# Patient Record
Sex: MALE | Employment: FULL TIME | URBAN - METROPOLITAN AREA
[De-identification: names, ages, dates, MRNs, and addresses within clinical notes are randomized per-mention and may not be internally consistent; named-entity substitution may affect disease eponyms.]

---

## 2022-08-12 ENCOUNTER — TELEPHONE (OUTPATIENT)
Dept: OTHER | Age: 57
End: 2022-08-12

## 2022-08-12 ENCOUNTER — INITIAL CONSULT (OUTPATIENT)
Dept: VASCULAR SURGERY | Age: 57
End: 2022-08-12
Payer: COMMERCIAL

## 2022-08-12 VITALS
RESPIRATION RATE: 20 BRPM | TEMPERATURE: 98.2 F | DIASTOLIC BLOOD PRESSURE: 67 MMHG | HEIGHT: 70 IN | WEIGHT: 143 LBS | SYSTOLIC BLOOD PRESSURE: 117 MMHG | HEART RATE: 62 BPM | OXYGEN SATURATION: 98 % | BODY MASS INDEX: 20.47 KG/M2

## 2022-08-12 DIAGNOSIS — Z01.818 PREOP EXAMINATION: ICD-10-CM

## 2022-08-12 DIAGNOSIS — I70.213 ATHEROSCLER OF NATIVE ARTERY OF BOTH LEGS WITH INTERMIT CLAUDICATION (HCC): Primary | ICD-10-CM

## 2022-08-12 DIAGNOSIS — I70.223 ATHEROSCLEROSIS OF NATIVE ARTERY OF BOTH LOWER EXTREMITIES WITH REST PAIN (HCC): ICD-10-CM

## 2022-08-12 PROCEDURE — 99204 OFFICE O/P NEW MOD 45 MIN: CPT | Performed by: SURGERY

## 2022-08-12 RX ORDER — PREGABALIN 50 MG/1
50 CAPSULE ORAL 3 TIMES DAILY
COMMUNITY

## 2022-08-12 ASSESSMENT — ENCOUNTER SYMPTOMS
COUGH: 0
SHORTNESS OF BREATH: 0
EYE PAIN: 0
COLOR CHANGE: 0
CHEST TIGHTNESS: 0
ABDOMINAL DISTENTION: 0
ABDOMINAL PAIN: 0
VOMITING: 0
VOICE CHANGE: 0
TROUBLE SWALLOWING: 0

## 2022-08-12 NOTE — TELEPHONE ENCOUNTER
Patient calls after hours for appointment issue that is scheduled for today. Spoke with office Wednesday and unsure if can make appt. On time. Patient soft transferred to office staff.   CLAUS.

## 2022-08-12 NOTE — PROGRESS NOTES
[facility]  [address] [facility]  [address] [place] [id number]  Dept: [phone]     Patient: Monica Rebolledo  : 1965  MRN: [id number]  DOS: 2022    Referring provider:  Keke Fernandez MD         HPI:  Monica Rebolledo is a 62 y.o. male who comes to the office for the first time regarding his occluded aorta and iliac arteries. He has short distance claudication at approximately 25 yards. He starts to limp at approximately 50 yards and cannot make a football field. He also has some evidence of rest pain in the lower extremities however I do not think that is severe. He does find himself placing his foot over the side of the bed at night. He has frequent cramps at night which may or may not be related to his arterial disease. He does get some aching in his feet but usually at rest he is without significant pain. He has never had ulcerations in his lower extremities previously. He has not had stroke, TIA or amaurosis fugax. He has never had any heart problems. He continues to work and his claudication is affecting his work. I asked him whether or not he would want an operation to fix his claudication and he was very emphatic that he would want any surgical procedure to improve his walking distance. He is very active and rides a bike and is unable to ride at this point because of his pain in the lower extremities. He plays music at night which is affected by his lower extremity fatigue at that time. In addition to his lower extremity claudication he also has problems with a left renal mass which may be malignant. It certainly looks neoplastic. He has seen urology with Dr. Angeline Brian and they are planning a robotic nephrectomy via transperitoneal approach.   The question became whether or not this could be a combined effort with nephrectomy and placement of the aortobifemoral bypass graft at the same for syncope, speech difficulty, weakness, numbness and headaches. Hematological:  Negative for adenopathy. Psychiatric/Behavioral:  Negative for behavioral problems and suicidal ideas. Vitals:    08/12/22 1312   BP: 117/67   Site: Left Upper Arm   Position: Sitting   Cuff Size: Medium Adult   Pulse: 62   Resp: 20   Temp: 98.2 °F (36.8 °C)   TempSrc: Temporal   SpO2: 98%   Weight: 143 lb (64.9 kg)   Height: 5' 10\" (1.778 m)          Physical Exam  Constitutional:       General: He is not in acute distress. HENT:      Mouth/Throat:      Mouth: Mucous membranes are moist.      Pharynx: Oropharynx is clear. Eyes:      General: No scleral icterus. Extraocular Movements: Extraocular movements intact. Conjunctiva/sclera: Conjunctivae normal.   Neck:      Thyroid: No thyroid mass or thyromegaly. Cardiovascular:      Rate and Rhythm: Normal rate and regular rhythm. Heart sounds: No murmur heard. Comments: He has no carotid bruits. His chest is clear to auscultation bilaterally. His heart has a regular rate and rhythm with no murmurs rubs or gallops. His abdomen is soft nontender nondistended. His lower extremities are warm and adequately perfused. He has good capillary refill. He has no femoral, popliteal, dorsalis pedis or posterior tibial pulses in either lower extremity. He has no ulceration. He has no gangrene. He has no edema. He has no varicosities. Pulmonary:      Effort: No respiratory distress. Breath sounds: No rales. Abdominal:      General: There is no distension. Palpations: There is no mass. Tenderness: There is no abdominal tenderness. There is no guarding. Musculoskeletal:      Cervical back: No rigidity or tenderness. Lymphadenopathy:      Cervical: No cervical adenopathy. Skin:     Coloration: Skin is not jaundiced. Findings: No rash. Neurological:      General: No focal deficit present.       Mental Status: He is alert and oriented to person, place, and time. Cranial Nerves: No cranial nerve deficit. Psychiatric:         Mood and Affect: Mood normal.       Assessment:  1. Atheroscler of native artery of both legs with intermit claudication ([address]    2. 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 They will perform the left nephrectomy robotically transperitoneally at which time I can then take over and perform aortobifemoral bypass. It does not seem that he would need common femoral endarterectomy on either side. He has good vessels in that regard. We will need to evaluate him with a preoperative stress test as well as bilateral lower extremity JULIO PVR and duplex. He understands and agrees with these plans and also understands that these procedures have significant and major risks including but not limited to bleeding, infection, hematoma, nerve damage, permanent neurologic disability, ureteral injury, kidney failure, limb loss, the need for bowel resection, the need for ostomy, stroke, heart attack and death. He agrees to proceed and again is quite emphatic about wanting to do something to improve his walking distance and his ability to work and activity level.     Electronically signed by:  Mary Lou Reyes MD

## 2022-08-24 ENCOUNTER — HOSPITAL ENCOUNTER (OUTPATIENT)
Dept: VASCULAR LAB | Age: 57
Discharge: HOME | End: 2022-08-26
Payer: COMMERCIAL

## 2022-08-24 DIAGNOSIS — I70.213 ATHEROSCLER OF NATIVE ARTERY OF BOTH LEGS WITH INTERMIT CLAUDICATION (HCC): ICD-10-CM

## 2022-08-24 DIAGNOSIS — I70.223 ATHEROSCLEROSIS OF NATIVE ARTERY OF BOTH LOWER EXTREMITIES WITH REST PAIN (HCC): ICD-10-CM

## 2022-08-24 PROCEDURE — 93925 LOWER EXTREMITY STUDY: CPT

## 2022-08-24 PROCEDURE — 93923 UPR/LXTR ART STDY 3+ LVLS: CPT

## 2022-08-29 ENCOUNTER — HOSPITAL ENCOUNTER (OUTPATIENT)
Dept: NON INVASIVE DIAGNOSTICS | Age: 57
Discharge: HOME | End: 2022-08-29
Payer: COMMERCIAL

## 2022-08-29 DIAGNOSIS — Z01.818 PREOP EXAMINATION: ICD-10-CM

## 2022-08-29 PROCEDURE — 6360000002 HC RX W HCPCS: Performed by: FAMILY MEDICINE

## 2022-08-29 PROCEDURE — 3430000000 HC RX DIAGNOSTIC RADIOPHARMACEUTICAL: Performed by: SURGERY

## 2022-08-29 PROCEDURE — A9500 TC99M SESTAMIBI: HCPCS | Performed by: SURGERY

## 2022-08-29 PROCEDURE — 93017 CV STRESS TEST TRACING ONLY: CPT

## 2022-08-29 RX ADMIN — Medication 30 MILLICURIE: at 10:11

## 2022-08-29 RX ADMIN — REGADENOSON 0.4 MG: 0.08 INJECTION, SOLUTION INTRAVENOUS at 10:24

## 2022-08-30 ENCOUNTER — HOSPITAL ENCOUNTER (OUTPATIENT)
Dept: NON INVASIVE DIAGNOSTICS | Age: 57
Discharge: HOME | End: 2022-08-30
Payer: COMMERCIAL

## 2022-08-30 PROCEDURE — 78452 HT MUSCLE IMAGE SPECT MULT: CPT

## 2022-08-30 PROCEDURE — A9500 TC99M SESTAMIBI: HCPCS | Performed by: SURGERY

## 2022-08-30 PROCEDURE — 3430000000 HC RX DIAGNOSTIC RADIOPHARMACEUTICAL: Performed by: SURGERY

## 2022-08-30 RX ADMIN — Medication 30 MILLICURIE: at 15:09

## 2022-08-31 NOTE — PROCEDURES
[address] [place], [place] [address]                              CARDIAC STRESS TEST    PATIENT NAME: Nancy Valdez                  :        1965  MED REC NO:   [id number]                              ROOM:  ACCOUNT NO:   [de-identified]                           ADMIT DATE: 2022  PROVIDER:     Rojelio Herndon MD    CARDIOVASCULAR DIAGNOSTIC DEPARTMENT    DATE OF STUDY:  2022    ORDERING PROVIDER:  Jc Crane MD    PRIMARY CARE PROVIDER:  None    INTERPRETING PHYSICIAN:  Rojelio Herndon MD    PHARMACOLOGIC MYOCARDIAL PERFUSION STRESS TESTING    Stress/rest single isotope SPECT imaging with exercise stress and gated  SPECT imaging. INDICATIONS:  Pre-op. CLINICAL HISTORY:  The patient is a 51-year-old man with no known  coronary artery disease. Previous cardiac history includes stress test.    Other previous history includes chest pain, caffeine, smoker. Symptoms just prior to testing:  None. Relevant medications:  None. PROCEDURE:  The heart rate was 50 at baseline and da to 93 beats per  minute during the regadenoson infusion. The rest blood pressure was  100/60 mm/Hg and increased to 140/70 mm/Hg. The patient did complain of  shortness of breath following infusion. Pharmacologic stress testing was performed with regadenoson at a dose of  0.4 mg. Additionally, low level exercise using slow treadmill walking  was performed along with vasodilator infusion. MYOCARDIAL PERFUSION IMAGING:  Imaging was performed at rest 30-45  minutes following the injection of 30 mCi of sestamibi. Approximately  10 seconds after Lexiscan injection, the patient was injected with 30  mCi of sestamibi. Gating post-stress tomographic imaging was performed  30-45 minutes after stress.     STRESS ECG RESULTS:  The resting electrocardiogram demonstrated normal  sinus rhythm with right bundle branch block without significant  ST-segment abnormalities that may impair accurate ECG detection of  stress induced cardiac ischemia. During vasodilator infusion and during recovery, the patient developed:    No significant ST segment changes suggestive of myocardial ischemia with  no premature atrial contractions (PACs) and no premature ventricular  contractions (PVCs). NUCLEAR IMAGING RESULTS:  The overall quality of the study is fair. Mild attenuation artifact was seen. There is no evidence of abnormal  lung uptake. Additionally, the right ventricle appears normal.  The  left ventricular cavity is noted to be normal in size on the stress  images. There is no evidence of transient ischemic dilatation (TID) of  the left ventricle. Gated SPECT imaging reveals normal myocardial thickening and wall motion  with a calculated left ventricular ejection fraction of 57%. The rest images demonstrated a small/moderate perfusion abnormality of  mild intensity in the inferior, apical and inferoapical region(s), which  is most likely due to artifact. On stress imaging, a small/moderate perfusion abnormality of mild  intensity in the inferior, apical and inferoapical region(s), which is  most likely due to artifact. IMPRESSION:  1. Equivocal myocardial perfusion study. There is a small/moderate  perfusion defect of mild intensity in the inferior, apical and  inferoapical region(s), which is most consistent with artifact, but  cannot rule out a small degree of coronary ischemia. 2.  Global left ventricular systolic function was normal with an EF of  57% without regional wall motion abnormalities. 3.  No significant electrocardiographic evidence of myocardial ischemia  during EKG monitoring without significant associated arrhythmias. Overall, these results are most consistent with a low risk for ischemia.     Depending on the patient's symptoms and level of clinical suspicion,  aggressive medical management versus additional testing by coronary  angiography may be indicated. Manas Godwin MD    D: 08/31/2022 14:07:14       T: 08/31/2022 14:08:17     [email or web address]  Job#: [id number]     Doc#: Unknown    CC:  Kenan Gary.  Gregorio Briggs

## 2022-10-28 ENCOUNTER — OFFICE VISIT (OUTPATIENT)
Dept: VASCULAR SURGERY | Age: 57
End: 2022-10-28
Payer: COMMERCIAL

## 2022-10-28 VITALS
HEIGHT: 70 IN | OXYGEN SATURATION: 100 % | RESPIRATION RATE: 17 BRPM | WEIGHT: 141 LBS | SYSTOLIC BLOOD PRESSURE: 129 MMHG | HEART RATE: 60 BPM | DIASTOLIC BLOOD PRESSURE: 71 MMHG | TEMPERATURE: 97.4 F | BODY MASS INDEX: 20.19 KG/M2

## 2022-10-28 DIAGNOSIS — I70.223 ATHEROSCLEROSIS OF NATIVE ARTERY OF BOTH LOWER EXTREMITIES WITH REST PAIN (HCC): Primary | ICD-10-CM

## 2022-10-28 DIAGNOSIS — I70.213 ATHEROSCLER OF NATIVE ARTERY OF BOTH LEGS WITH INTERMIT CLAUDICATION (HCC): ICD-10-CM

## 2022-10-28 PROCEDURE — 99214 OFFICE O/P EST MOD 30 MIN: CPT | Performed by: SURGERY

## 2022-10-28 RX ORDER — NICOTINE 21 MG/24HR
1 PATCH, TRANSDERMAL 24 HOURS TRANSDERMAL EVERY 24 HOURS
COMMUNITY
Start: 2022-10-03

## 2022-10-28 ASSESSMENT — ENCOUNTER SYMPTOMS
VOICE CHANGE: 0
SHORTNESS OF BREATH: 0
EYE PAIN: 0
ABDOMINAL DISTENTION: 0
COLOR CHANGE: 0
TROUBLE SWALLOWING: 0
CHEST TIGHTNESS: 0
VOMITING: 0
ABDOMINAL PAIN: 0
COUGH: 0

## 2022-10-28 NOTE — PROGRESS NOTES
[facility]  [address] [facility]  [address] [place] [address]  Dept: [phone]     Patient: Boaz Echeverria  : 1965  MRN: [id number]  DOS: 10/28/2022    Referring provider:  No ref. provider found         HPI:  Boaz Echeverria is a 62 y.o. male who returns to the office for his aortic and iliac occlusions. Recall that he has bilateral lower extremity claudication and at times bilateral lower extremity rest pain. He can walk approximately 25 to 50 yards before having significant pain. He is still working for [employer] and still has a requirement of walking quite a bit. Unfortunately he also has a renal tumor on his left and concomitant nephrectomy and aortobifemoral bypass was considered. Urology decided that I should go ahead with the aortobifemoral bypass first and they would then proceed with nephrectomy later. I had a long discussion with the patient regarding aortobifemoral bypass grafting today and the risks benefits and alternatives. He did have his cardiac work-up including a stress test which showed no significant issues and is deemed low risk. History reviewed. No pertinent past medical history. History reviewed. No pertinent family history.    Social History     Socioeconomic History    Marital status:      Spouse name: Not on file    Number of children: Not on file    Years of education: Not on file    Highest education level: Not on file   Occupational History    Not on file   Tobacco Use    Smoking status: Every Day     Types: Cigarettes    Smokeless tobacco: Never   Substance and Sexual Activity    Alcohol use: Never    Drug use: Yes     Types: Marijuana Fab Hilario    Sexual activity: Not on file   Other Topics Concern    Not on file   Social History Narrative    Not on file     Social Determinants of Health     Financial Resource Strain: Not on file   Food Insecurity: Not on file Transportation Needs: Not on file   Physical Activity: Not on file   Stress: Not on file   Social Connections: Not on file   Intimate Partner Violence: Not on file   Housing Stability: Not on file      History reviewed. No pertinent surgical history. Review of Systems   Constitutional:  Negative for activity change, fever and unexpected weight change. HENT:  Negative for trouble swallowing and voice change. Eyes:  Negative for pain and visual disturbance. Respiratory:  Negative for cough, chest tightness and shortness of breath. Cardiovascular:  Negative for chest pain and palpitations. Gastrointestinal:  Negative for abdominal distention, abdominal pain and vomiting. Endocrine: Negative for cold intolerance and heat intolerance. Genitourinary:  Negative for dysuria, flank pain and hematuria. Musculoskeletal:  Negative for joint swelling and neck pain. Skin:  Negative for color change and rash. Allergic/Immunologic: Negative for immunocompromised state. Neurological:  Negative for syncope, speech difficulty, weakness, numbness and headaches. Hematological:  Negative for adenopathy. Psychiatric/Behavioral:  Negative for behavioral problems and suicidal ideas. Vitals:    10/28/22 1421   BP: 129/71   Site: Right Upper Arm   Position: Sitting   Cuff Size: Medium Adult   Pulse: 60   Resp: 17   Temp: 97.4 °F (36.3 °C)   TempSrc: Temporal   SpO2: 100%   Weight: 141 lb (64 kg)   Height: 5' 10\" (1.778 m)          Physical Exam  Constitutional:       General: He is not in acute distress. HENT:      Mouth/Throat:      Mouth: Mucous membranes are moist.      Pharynx: Oropharynx is clear. Eyes:      General: No scleral icterus. Extraocular Movements: Extraocular movements intact. Conjunctiva/sclera: Conjunctivae normal.   Neck:      Thyroid: No thyroid mass or thyromegaly. Cardiovascular:      Rate and Rhythm: Normal rate and regular rhythm. Heart sounds: No murmur heard. Comments: On examination he has no aortic pulse. He has no femoral popliteal dorsalis pedis or posterior tibial pulses in either lower extremity. He has no ulceration or gangrene. He has minimal dependent rubor. He has no edema and no varicosities. Pulmonary:      Effort: No respiratory distress. Breath sounds: No rales. Abdominal:      General: There is no distension. Palpations: There is no mass. Tenderness: There is no abdominal tenderness. There is no guarding. Musculoskeletal:      Cervical back: No rigidity or tenderness. Lymphadenopathy:      Cervical: No cervical adenopathy. Skin:     Coloration: Skin is not jaundiced. Findings: No rash. Neurological:      General: No focal deficit present. Mental Status: He is alert and oriented to person, place, and time. Cranial Nerves: No cranial nerve deficit. Psychiatric:         Mood and Affect: Mood normal.       Assessment:  1. 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 Atheroscler of native artery of both legs with intermit claudication ([address]          Plan: At this time he is still very interested and enthusiastic about going ahead with aortobifemoral bypass so that he can walk. He explains to me that nonsurgical therapy is not an option for him. I explained to him that there still are significant risks to these procedures including but not limited to bleeding, infection, hematoma, nerve damage, permanent neurologic disability, limb loss, bowel ischemia, spinal cord ischemia, myocardial infarction, stroke, heart attack and death as well as injury to any intra-abdominal structures. He understands that there is a risk of graft infection which would mandate its removal and possibly even render him with critical limb ischemia and the need for amputation later on. He understands that there is a risk for limb loss with these procedures as well.   He wants to go ahead to improve his lower extremity symptoms of claudication and rest pain. He has minimal rest pain but significant claudication. We will see him in the operating room for aortobifemoral bypass in the near future.     Electronically signed by:  Earlene Noguera MD

## 2022-11-14 ENCOUNTER — ANESTHESIA EVENT (OUTPATIENT)
Dept: OPERATING ROOM | Age: 57
DRG: 271 | End: 2022-11-14
Payer: COMMERCIAL

## 2022-11-15 ENCOUNTER — ANESTHESIA (OUTPATIENT)
Dept: OPERATING ROOM | Age: 57
DRG: 271 | End: 2022-11-15
Payer: COMMERCIAL

## 2022-11-15 ENCOUNTER — HOSPITAL ENCOUNTER (INPATIENT)
Age: 57
LOS: 4 days | Discharge: HOME | DRG: 271 | End: 2022-11-19
Attending: SURGERY | Admitting: SURGERY
Payer: COMMERCIAL

## 2022-11-15 PROBLEM — I70.0 OCCLUSION OF AORTA (HCC): Status: ACTIVE | Noted: 2022-11-15

## 2022-11-15 LAB
ABSOLUTE EOS #: 0 K/UL (ref 0–0.4)
ABSOLUTE IMMATURE GRANULOCYTE: 0.28 K/UL (ref 0–0.3)
ABSOLUTE LYMPH #: 1.39 K/UL (ref 1–4.8)
ABSOLUTE MONO #: 0.56 K/UL (ref 0.1–0.8)
ALLEN TEST: ABNORMAL
ANION GAP SERPL CALCULATED.3IONS-SCNC: 14 MMOL/L (ref 9–17)
ANION GAP: 13 MMOL/L (ref 7–16)
BASOPHILS # BLD: 1 % (ref 0–2)
BASOPHILS ABSOLUTE: 0.28 K/UL (ref 0–0.2)
BUN BLDV-MCNC: 18 MG/DL (ref 6–20)
CALCIUM SERPL-MCNC: 8.1 MG/DL (ref 8.6–10.4)
CHLORIDE BLD-SCNC: 105 MMOL/L (ref 98–107)
CO2: 20 MMOL/L (ref 20–31)
CREAT SERPL-MCNC: 0.87 MG/DL (ref 0.7–1.2)
EGFR, POC: >60 ML/MIN/1.73M2
EGFR, POC: >60 ML/MIN/1.73M2
EKG ATRIAL RATE: 109 BPM
EKG P AXIS: 70 DEGREES
EKG P-R INTERVAL: 142 MS
EKG Q-T INTERVAL: 390 MS
EKG QRS DURATION: 142 MS
EKG QTC CALCULATION (BAZETT): 525 MS
EKG R AXIS: -83 DEGREES
EKG T AXIS: 56 DEGREES
EKG VENTRICULAR RATE: 109 BPM
EOSINOPHILS RELATIVE PERCENT: 0 % (ref 1–4)
GFR SERPL CREATININE-BSD FRML MDRD: >60 ML/MIN/1.73M2
GLUCOSE BLD-MCNC: 107 MG/DL (ref 74–100)
GLUCOSE BLD-MCNC: 139 MG/DL (ref 75–110)
GLUCOSE BLD-MCNC: 253 MG/DL (ref 74–100)
GLUCOSE BLD-MCNC: 261 MG/DL (ref 70–99)
HCO3 VENOUS: 21.8 MMOL/L (ref 22–29)
HCT VFR BLD CALC: 39.2 % (ref 40.7–50.3)
HCT VFR BLD CALC: 40.1 % (ref 40.7–50.3)
HCT VFR BLD CALC: 41 % (ref 40.7–50.3)
HEMOGLOBIN: 13.1 G/DL (ref 13–17)
HEMOGLOBIN: 13.2 G/DL (ref 13–17)
HEMOGLOBIN: 13.7 G/DL (ref 13–17)
IMMATURE GRANULOCYTES: 1 %
LACTIC ACID, WHOLE BLOOD: 1.2 MMOL/L (ref 0.7–2.1)
LACTIC ACID, WHOLE BLOOD: 3.3 MMOL/L (ref 0.7–2.1)
LYMPHOCYTES # BLD: 5 % (ref 24–44)
MCH RBC QN AUTO: 30.5 PG (ref 25.2–33.5)
MCH RBC QN AUTO: 30.9 PG (ref 25.2–33.5)
MCHC RBC AUTO-ENTMCNC: 32.7 G/DL (ref 28.4–34.8)
MCHC RBC AUTO-ENTMCNC: 33.4 G/DL (ref 28.4–34.8)
MCV RBC AUTO: 92.3 FL (ref 82.6–102.9)
MCV RBC AUTO: 93.5 FL (ref 82.6–102.9)
MONOCYTES # BLD: 2 % (ref 1–7)
MORPHOLOGY: ABNORMAL
NEGATIVE BASE EXCESS, ART: 6 (ref 0–2)
NEGATIVE BASE EXCESS, VEN: 4 (ref 0–2)
NRBC AUTOMATED: 0 PER 100 WBC
NRBC AUTOMATED: 0 PER 100 WBC
O2 DEVICE/FLOW/%: ABNORMAL
O2 SAT, VEN: 98 % (ref 60–85)
PCO2, VEN: 43.5 MM HG (ref 41–51)
PDW BLD-RTO: 15.3 % (ref 11.8–14.4)
PDW BLD-RTO: 15.4 % (ref 11.8–14.4)
PH VENOUS: 7.31 (ref 7.32–7.43)
PLATELET # BLD: 159 K/UL (ref 138–453)
PLATELET # BLD: 203 K/UL (ref 138–453)
PMV BLD AUTO: 10.3 FL (ref 8.1–13.5)
PMV BLD AUTO: 10.6 FL (ref 8.1–13.5)
PO2, VEN: 114.7 MM HG (ref 30–50)
POC BUN: 17 MG/DL (ref 8–26)
POC BUN: 18 MG/DL (ref 8–26)
POC CHLORIDE: 110 MMOL/L (ref 98–107)
POC CHLORIDE: 112 MMOL/L (ref 98–107)
POC CREATININE: 0.69 MG/DL (ref 0.51–1.19)
POC CREATININE: 0.9 MG/DL (ref 0.51–1.19)
POC HCO3: 21.6 MMOL/L (ref 21–28)
POC HEMATOCRIT: 42 % (ref 41–53)
POC HEMATOCRIT: 43 % (ref 41–53)
POC HEMOGLOBIN: 14.1 G/DL (ref 13.5–17.5)
POC HEMOGLOBIN: 14.5 G/DL (ref 13.5–17.5)
POC IONIZED CALCIUM: 1.18 MMOL/L (ref 1.15–1.33)
POC LACTIC ACID: 3.24 MMOL/L (ref 0.56–1.39)
POC LACTIC ACID: 3.84 MMOL/L (ref 0.56–1.39)
POC O2 SATURATION: 99 % (ref 94–98)
POC PCO2: 47.7 MM HG (ref 35–48)
POC PH: 7.26 (ref 7.35–7.45)
POC PO2: 142 MM HG (ref 83–108)
POC POTASSIUM: 3.6 MMOL/L (ref 3.5–4.5)
POC POTASSIUM: 3.9 MMOL/L (ref 3.5–4.5)
POC SODIUM: 143 MMOL/L (ref 138–146)
POC SODIUM: 144 MMOL/L (ref 138–146)
POC TCO2: 22 MMOL/L (ref 22–30)
POTASSIUM SERPL-SCNC: 3.8 MMOL/L (ref 3.7–5.3)
RBC # BLD: 4.29 M/UL (ref 4.21–5.77)
RBC # BLD: 4.44 M/UL (ref 4.21–5.77)
SAMPLE SITE: ABNORMAL
SEG NEUTROPHILS: 91 % (ref 36–66)
SEGMENTED NEUTROPHILS ABSOLUTE COUNT: 25.29 K/UL (ref 1.8–7.7)
SODIUM BLD-SCNC: 139 MMOL/L (ref 135–144)
WBC # BLD: 27.8 K/UL (ref 3.5–11.3)
WBC # BLD: 9.7 K/UL (ref 3.5–11.3)

## 2022-11-15 PROCEDURE — 83605 ASSAY OF LACTIC ACID: CPT

## 2022-11-15 PROCEDURE — 2580000003 HC RX 258: Performed by: STUDENT IN AN ORGANIZED HEALTH CARE EDUCATION/TRAINING PROGRAM

## 2022-11-15 PROCEDURE — C1768 GRAFT, VASCULAR: HCPCS | Performed by: SURGERY

## 2022-11-15 PROCEDURE — 82565 ASSAY OF CREATININE: CPT

## 2022-11-15 PROCEDURE — 86920 COMPATIBILITY TEST SPIN: CPT

## 2022-11-15 PROCEDURE — 86850 RBC ANTIBODY SCREEN: CPT

## 2022-11-15 PROCEDURE — 85014 HEMATOCRIT: CPT

## 2022-11-15 PROCEDURE — 82947 ASSAY GLUCOSE BLOOD QUANT: CPT

## 2022-11-15 PROCEDURE — 93005 ELECTROCARDIOGRAM TRACING: CPT | Performed by: SURGERY

## 2022-11-15 PROCEDURE — 04100JK BYPASS ABDOMINAL AORTA TO BILATERAL FEMORAL ARTERIES WITH SYNTHETIC SUBSTITUTE, OPEN APPROACH: ICD-10-PCS | Performed by: SURGERY

## 2022-11-15 PROCEDURE — 82435 ASSAY OF BLOOD CHLORIDE: CPT

## 2022-11-15 PROCEDURE — 93010 ELECTROCARDIOGRAM REPORT: CPT | Performed by: INTERNAL MEDICINE

## 2022-11-15 PROCEDURE — 3600000005 HC SURGERY LEVEL 5 BASE: Performed by: SURGERY

## 2022-11-15 PROCEDURE — 3600000015 HC SURGERY LEVEL 5 ADDTL 15MIN: Performed by: SURGERY

## 2022-11-15 PROCEDURE — 84132 ASSAY OF SERUM POTASSIUM: CPT

## 2022-11-15 PROCEDURE — 64488 TAP BLOCK BI INJECTION: CPT | Performed by: STUDENT IN AN ORGANIZED HEALTH CARE EDUCATION/TRAINING PROGRAM

## 2022-11-15 PROCEDURE — 84520 ASSAY OF UREA NITROGEN: CPT

## 2022-11-15 PROCEDURE — 36415 COLL VENOUS BLD VENIPUNCTURE: CPT

## 2022-11-15 PROCEDURE — 6360000002 HC RX W HCPCS: Performed by: STUDENT IN AN ORGANIZED HEALTH CARE EDUCATION/TRAINING PROGRAM

## 2022-11-15 PROCEDURE — 6360000002 HC RX W HCPCS: Performed by: SURGERY

## 2022-11-15 PROCEDURE — 84295 ASSAY OF SERUM SODIUM: CPT

## 2022-11-15 PROCEDURE — 2709999900 HC NON-CHARGEABLE SUPPLY: Performed by: SURGERY

## 2022-11-15 PROCEDURE — 82330 ASSAY OF CALCIUM: CPT

## 2022-11-15 PROCEDURE — 2000000000 HC ICU R&B

## 2022-11-15 PROCEDURE — 85025 COMPLETE CBC W/AUTO DIFF WBC: CPT

## 2022-11-15 PROCEDURE — 94761 N-INVAS EAR/PLS OXIMETRY MLT: CPT

## 2022-11-15 PROCEDURE — 80051 ELECTROLYTE PANEL: CPT

## 2022-11-15 PROCEDURE — 6370000000 HC RX 637 (ALT 250 FOR IP): Performed by: SURGERY

## 2022-11-15 PROCEDURE — 86901 BLOOD TYPING SEROLOGIC RH(D): CPT

## 2022-11-15 PROCEDURE — 85027 COMPLETE CBC AUTOMATED: CPT

## 2022-11-15 PROCEDURE — 86900 BLOOD TYPING SEROLOGIC ABO: CPT

## 2022-11-15 PROCEDURE — 3700000001 HC ADD 15 MINUTES (ANESTHESIA): Performed by: SURGERY

## 2022-11-15 PROCEDURE — 3700000000 HC ANESTHESIA ATTENDED CARE: Performed by: SURGERY

## 2022-11-15 PROCEDURE — 2580000003 HC RX 258: Performed by: SURGERY

## 2022-11-15 PROCEDURE — 80048 BASIC METABOLIC PNL TOTAL CA: CPT

## 2022-11-15 PROCEDURE — 35646 BPG AORTOBIFEMORAL: CPT | Performed by: SURGERY

## 2022-11-15 PROCEDURE — 2700000000 HC OXYGEN THERAPY PER DAY

## 2022-11-15 PROCEDURE — 37799 UNLISTED PX VASCULAR SURGERY: CPT

## 2022-11-15 PROCEDURE — 7100000000 HC PACU RECOVERY - FIRST 15 MIN

## 2022-11-15 PROCEDURE — 6370000000 HC RX 637 (ALT 250 FOR IP): Performed by: STUDENT IN AN ORGANIZED HEALTH CARE EDUCATION/TRAINING PROGRAM

## 2022-11-15 PROCEDURE — 82803 BLOOD GASES ANY COMBINATION: CPT

## 2022-11-15 PROCEDURE — 2500000003 HC RX 250 WO HCPCS: Performed by: SURGERY

## 2022-11-15 PROCEDURE — 2500000003 HC RX 250 WO HCPCS: Performed by: STUDENT IN AN ORGANIZED HEALTH CARE EDUCATION/TRAINING PROGRAM

## 2022-11-15 PROCEDURE — A4217 STERILE WATER/SALINE, 500 ML: HCPCS | Performed by: SURGERY

## 2022-11-15 PROCEDURE — 85018 HEMOGLOBIN: CPT

## 2022-11-15 PROCEDURE — 7100000001 HC PACU RECOVERY - ADDTL 15 MIN

## 2022-11-15 DEVICE — ALBOGRAFT POLYESTER VASCULAR GRAFT 16CMX8MM
Type: IMPLANTABLE DEVICE | Status: FUNCTIONAL
Brand: ALBOGRAFT POLYESTER VASCULAR GRAFT

## 2022-11-15 RX ORDER — DEXTROSE MONOHYDRATE 100 MG/ML
INJECTION, SOLUTION INTRAVENOUS CONTINUOUS PRN
Status: DISCONTINUED | OUTPATIENT
Start: 2022-11-15 | End: 2022-11-19 | Stop reason: HOSPADM

## 2022-11-15 RX ORDER — BUPIVACAINE HYDROCHLORIDE 2.5 MG/ML
INJECTION, SOLUTION EPIDURAL; INFILTRATION; INTRACAUDAL
Status: COMPLETED | OUTPATIENT
Start: 2022-11-15 | End: 2022-11-15

## 2022-11-15 RX ORDER — NICARDIPINE HYDROCHLORIDE 0.1 MG/ML
INJECTION INTRAVENOUS CONTINUOUS PRN
Status: DISCONTINUED | OUTPATIENT
Start: 2022-11-15 | End: 2022-11-15 | Stop reason: SDUPTHER

## 2022-11-15 RX ORDER — SODIUM CHLORIDE 9 MG/ML
INJECTION, SOLUTION INTRAVENOUS PRN
Status: DISCONTINUED | OUTPATIENT
Start: 2022-11-15 | End: 2022-11-19 | Stop reason: HOSPADM

## 2022-11-15 RX ORDER — SODIUM CHLORIDE, SODIUM LACTATE, POTASSIUM CHLORIDE, CALCIUM CHLORIDE 600; 310; 30; 20 MG/100ML; MG/100ML; MG/100ML; MG/100ML
INJECTION, SOLUTION INTRAVENOUS CONTINUOUS PRN
Status: DISCONTINUED | OUTPATIENT
Start: 2022-11-15 | End: 2022-11-15 | Stop reason: SDUPTHER

## 2022-11-15 RX ORDER — SODIUM CHLORIDE 0.9 % (FLUSH) 0.9 %
5-40 SYRINGE (ML) INJECTION PRN
Status: DISCONTINUED | OUTPATIENT
Start: 2022-11-15 | End: 2022-11-19 | Stop reason: HOSPADM

## 2022-11-15 RX ORDER — BUPIVACAINE HYDROCHLORIDE 2.5 MG/ML
INJECTION, SOLUTION EPIDURAL; INFILTRATION; INTRACAUDAL
Status: COMPLETED
Start: 2022-11-15 | End: 2022-11-15

## 2022-11-15 RX ORDER — FENTANYL CITRATE 50 UG/ML
25 INJECTION, SOLUTION INTRAMUSCULAR; INTRAVENOUS EVERY 5 MIN PRN
Status: DISCONTINUED | OUTPATIENT
Start: 2022-11-15 | End: 2022-11-15

## 2022-11-15 RX ORDER — SODIUM CHLORIDE 9 MG/ML
INJECTION, SOLUTION INTRAVENOUS CONTINUOUS PRN
Status: DISCONTINUED | OUTPATIENT
Start: 2022-11-15 | End: 2022-11-15 | Stop reason: SDUPTHER

## 2022-11-15 RX ORDER — NICOTINE 21 MG/24HR
1 PATCH, TRANSDERMAL 24 HOURS TRANSDERMAL EVERY 24 HOURS
Status: DISCONTINUED | OUTPATIENT
Start: 2022-11-16 | End: 2022-11-19 | Stop reason: HOSPADM

## 2022-11-15 RX ORDER — DEXAMETHASONE SODIUM PHOSPHATE 10 MG/ML
INJECTION INTRAMUSCULAR; INTRAVENOUS PRN
Status: DISCONTINUED | OUTPATIENT
Start: 2022-11-15 | End: 2022-11-15 | Stop reason: SDUPTHER

## 2022-11-15 RX ORDER — SODIUM CHLORIDE, SODIUM LACTATE, POTASSIUM CHLORIDE, AND CALCIUM CHLORIDE .6; .31; .03; .02 G/100ML; G/100ML; G/100ML; G/100ML
1000 INJECTION, SOLUTION INTRAVENOUS ONCE
Status: DISCONTINUED | OUTPATIENT
Start: 2022-11-15 | End: 2022-11-19 | Stop reason: HOSPADM

## 2022-11-15 RX ORDER — ONDANSETRON 4 MG/1
4 TABLET, ORALLY DISINTEGRATING ORAL EVERY 8 HOURS PRN
Status: DISCONTINUED | OUTPATIENT
Start: 2022-11-15 | End: 2022-11-19 | Stop reason: HOSPADM

## 2022-11-15 RX ORDER — ONDANSETRON 2 MG/ML
INJECTION INTRAMUSCULAR; INTRAVENOUS PRN
Status: DISCONTINUED | OUTPATIENT
Start: 2022-11-15 | End: 2022-11-15 | Stop reason: SDUPTHER

## 2022-11-15 RX ORDER — MORPHINE SULFATE 2 MG/ML
2 INJECTION, SOLUTION INTRAMUSCULAR; INTRAVENOUS EVERY 5 MIN PRN
Status: DISCONTINUED | OUTPATIENT
Start: 2022-11-15 | End: 2022-11-15

## 2022-11-15 RX ORDER — INSULIN LISPRO 100 [IU]/ML
0-16 INJECTION, SOLUTION INTRAVENOUS; SUBCUTANEOUS 3 TIMES DAILY
Status: DISCONTINUED | OUTPATIENT
Start: 2022-11-15 | End: 2022-11-19 | Stop reason: HOSPADM

## 2022-11-15 RX ORDER — SODIUM CHLORIDE 0.9 % (FLUSH) 0.9 %
5-40 SYRINGE (ML) INJECTION EVERY 12 HOURS SCHEDULED
Status: DISCONTINUED | OUTPATIENT
Start: 2022-11-15 | End: 2022-11-19 | Stop reason: HOSPADM

## 2022-11-15 RX ORDER — ONDANSETRON 2 MG/ML
4 INJECTION INTRAMUSCULAR; INTRAVENOUS EVERY 6 HOURS PRN
Status: DISCONTINUED | OUTPATIENT
Start: 2022-11-15 | End: 2022-11-19 | Stop reason: HOSPADM

## 2022-11-15 RX ORDER — OXYCODONE HYDROCHLORIDE 5 MG/1
5 TABLET ORAL EVERY 6 HOURS PRN
Status: DISCONTINUED | OUTPATIENT
Start: 2022-11-15 | End: 2022-11-19 | Stop reason: HOSPADM

## 2022-11-15 RX ORDER — LABETALOL HYDROCHLORIDE 5 MG/ML
10 INJECTION, SOLUTION INTRAVENOUS EVERY 4 HOURS PRN
Status: DISCONTINUED | OUTPATIENT
Start: 2022-11-15 | End: 2022-11-19 | Stop reason: HOSPADM

## 2022-11-15 RX ORDER — PREGABALIN 50 MG/1
50 CAPSULE ORAL 3 TIMES DAILY
Status: DISCONTINUED | OUTPATIENT
Start: 2022-11-15 | End: 2022-11-19 | Stop reason: HOSPADM

## 2022-11-15 RX ORDER — FENTANYL CITRATE 50 UG/ML
INJECTION, SOLUTION INTRAMUSCULAR; INTRAVENOUS PRN
Status: DISCONTINUED | OUTPATIENT
Start: 2022-11-15 | End: 2022-11-15 | Stop reason: SDUPTHER

## 2022-11-15 RX ORDER — HEPARIN SODIUM 1000 [USP'U]/ML
INJECTION, SOLUTION INTRAVENOUS; SUBCUTANEOUS PRN
Status: DISCONTINUED | OUTPATIENT
Start: 2022-11-15 | End: 2022-11-15 | Stop reason: SDUPTHER

## 2022-11-15 RX ORDER — HEPARIN SODIUM 1000 [USP'U]/ML
INJECTION, SOLUTION INTRAVENOUS; SUBCUTANEOUS PRN
Status: DISCONTINUED | OUTPATIENT
Start: 2022-11-15 | End: 2022-11-15 | Stop reason: ALTCHOICE

## 2022-11-15 RX ORDER — ISOFLURANE 1 ML/ML
LIQUID RESPIRATORY (INHALATION)
Status: DISPENSED
Start: 2022-11-15 | End: 2022-11-15

## 2022-11-15 RX ORDER — ASPIRIN 81 MG/1
81 TABLET ORAL DAILY
Status: DISCONTINUED | OUTPATIENT
Start: 2022-11-16 | End: 2022-11-19 | Stop reason: HOSPADM

## 2022-11-15 RX ORDER — LIDOCAINE HYDROCHLORIDE 10 MG/ML
INJECTION, SOLUTION EPIDURAL; INFILTRATION; INTRACAUDAL; PERINEURAL PRN
Status: DISCONTINUED | OUTPATIENT
Start: 2022-11-15 | End: 2022-11-15 | Stop reason: SDUPTHER

## 2022-11-15 RX ORDER — ACETAMINOPHEN 325 MG/1
650 TABLET ORAL
Status: ACTIVE | OUTPATIENT
Start: 2022-11-15 | End: 2022-11-16

## 2022-11-15 RX ORDER — PROPOFOL 10 MG/ML
INJECTION, EMULSION INTRAVENOUS PRN
Status: DISCONTINUED | OUTPATIENT
Start: 2022-11-15 | End: 2022-11-15 | Stop reason: SDUPTHER

## 2022-11-15 RX ORDER — NITROGLYCERIN 20 MG/100ML
INJECTION INTRAVENOUS CONTINUOUS PRN
Status: DISCONTINUED | OUTPATIENT
Start: 2022-11-15 | End: 2022-11-15 | Stop reason: SDUPTHER

## 2022-11-15 RX ORDER — NICARDIPINE HYDROCHLORIDE 0.1 MG/ML
2.5-15 INJECTION INTRAVENOUS CONTINUOUS
Status: DISCONTINUED | OUTPATIENT
Start: 2022-11-15 | End: 2022-11-17

## 2022-11-15 RX ORDER — MORPHINE SULFATE 2 MG/ML
2 INJECTION, SOLUTION INTRAMUSCULAR; INTRAVENOUS
Status: DISCONTINUED | OUTPATIENT
Start: 2022-11-15 | End: 2022-11-18

## 2022-11-15 RX ORDER — BUPIVACAINE HYDROCHLORIDE 2.5 MG/ML
INJECTION, SOLUTION EPIDURAL; INFILTRATION; INTRACAUDAL
Status: DISPENSED
Start: 2022-11-15 | End: 2022-11-15

## 2022-11-15 RX ORDER — SODIUM CHLORIDE, SODIUM LACTATE, POTASSIUM CHLORIDE, CALCIUM CHLORIDE 600; 310; 30; 20 MG/100ML; MG/100ML; MG/100ML; MG/100ML
INJECTION, SOLUTION INTRAVENOUS CONTINUOUS
Status: DISCONTINUED | OUTPATIENT
Start: 2022-11-15 | End: 2022-11-18

## 2022-11-15 RX ORDER — ACETAMINOPHEN 500 MG
1000 TABLET ORAL EVERY 8 HOURS SCHEDULED
Status: DISCONTINUED | OUTPATIENT
Start: 2022-11-15 | End: 2022-11-19 | Stop reason: HOSPADM

## 2022-11-15 RX ORDER — FENTANYL CITRATE 50 UG/ML
50 INJECTION, SOLUTION INTRAMUSCULAR; INTRAVENOUS
Status: DISPENSED | OUTPATIENT
Start: 2022-11-15 | End: 2022-11-16

## 2022-11-15 RX ORDER — KETOROLAC TROMETHAMINE 30 MG/ML
30 INJECTION, SOLUTION INTRAMUSCULAR; INTRAVENOUS ONCE
Status: COMPLETED | OUTPATIENT
Start: 2022-11-15 | End: 2022-11-15

## 2022-11-15 RX ORDER — MIDAZOLAM HYDROCHLORIDE 1 MG/ML
INJECTION INTRAMUSCULAR; INTRAVENOUS PRN
Status: DISCONTINUED | OUTPATIENT
Start: 2022-11-15 | End: 2022-11-15 | Stop reason: SDUPTHER

## 2022-11-15 RX ORDER — LABETALOL HYDROCHLORIDE 5 MG/ML
10 INJECTION, SOLUTION INTRAVENOUS EVERY 4 HOURS
Status: DISCONTINUED | OUTPATIENT
Start: 2022-11-15 | End: 2022-11-15

## 2022-11-15 RX ORDER — PROTAMINE SULFATE 10 MG/ML
INJECTION, SOLUTION INTRAVENOUS PRN
Status: DISCONTINUED | OUTPATIENT
Start: 2022-11-15 | End: 2022-11-15 | Stop reason: SDUPTHER

## 2022-11-15 RX ORDER — KETOROLAC TROMETHAMINE 15 MG/ML
15 INJECTION, SOLUTION INTRAMUSCULAR; INTRAVENOUS EVERY 6 HOURS
Status: COMPLETED | OUTPATIENT
Start: 2022-11-15 | End: 2022-11-17

## 2022-11-15 RX ORDER — MAGNESIUM HYDROXIDE 1200 MG/15ML
LIQUID ORAL CONTINUOUS PRN
Status: COMPLETED | OUTPATIENT
Start: 2022-11-15 | End: 2022-11-15

## 2022-11-15 RX ORDER — HEPARIN SODIUM 1000 [USP'U]/ML
INJECTION, SOLUTION INTRAVENOUS; SUBCUTANEOUS
Status: DISCONTINUED
Start: 2022-11-15 | End: 2022-11-15

## 2022-11-15 RX ORDER — INSULIN LISPRO 100 [IU]/ML
0-4 INJECTION, SOLUTION INTRAVENOUS; SUBCUTANEOUS NIGHTLY
Status: DISCONTINUED | OUTPATIENT
Start: 2022-11-15 | End: 2022-11-19 | Stop reason: HOSPADM

## 2022-11-15 RX ORDER — CEFAZOLIN SODIUM 1 G/3ML
INJECTION, POWDER, FOR SOLUTION INTRAMUSCULAR; INTRAVENOUS PRN
Status: DISCONTINUED | OUTPATIENT
Start: 2022-11-15 | End: 2022-11-15 | Stop reason: SDUPTHER

## 2022-11-15 RX ORDER — ENOXAPARIN SODIUM 100 MG/ML
40 INJECTION SUBCUTANEOUS DAILY
Status: DISCONTINUED | OUTPATIENT
Start: 2022-11-16 | End: 2022-11-19 | Stop reason: HOSPADM

## 2022-11-15 RX ORDER — RIFAMPIN 600 MG/10ML
INJECTION, POWDER, LYOPHILIZED, FOR SOLUTION INTRAVENOUS
Status: DISPENSED
Start: 2022-11-15 | End: 2022-11-15

## 2022-11-15 RX ORDER — PROTAMINE SULFATE 10 MG/ML
INJECTION, SOLUTION INTRAVENOUS
Status: COMPLETED
Start: 2022-11-15 | End: 2022-11-15

## 2022-11-15 RX ORDER — ROCURONIUM BROMIDE 10 MG/ML
INJECTION, SOLUTION INTRAVENOUS PRN
Status: DISCONTINUED | OUTPATIENT
Start: 2022-11-15 | End: 2022-11-15 | Stop reason: SDUPTHER

## 2022-11-15 RX ORDER — NICARDIPINE HYDROCHLORIDE 0.1 MG/ML
INJECTION INTRAVENOUS
Status: COMPLETED
Start: 2022-11-15 | End: 2022-11-15

## 2022-11-15 RX ADMIN — NICARDIPINE HYDROCHLORIDE 12.5 MG/HR: 0.1 INJECTION INTRAVENOUS at 19:13

## 2022-11-15 RX ADMIN — SUGAMMADEX 200 MG: 100 INJECTION, SOLUTION INTRAVENOUS at 12:28

## 2022-11-15 RX ADMIN — FENTANYL CITRATE 100 MCG: 0.05 INJECTION, SOLUTION INTRAMUSCULAR; INTRAVENOUS at 07:59

## 2022-11-15 RX ADMIN — SODIUM CHLORIDE, POTASSIUM CHLORIDE, SODIUM LACTATE AND CALCIUM CHLORIDE: 600; 310; 30; 20 INJECTION, SOLUTION INTRAVENOUS at 07:54

## 2022-11-15 RX ADMIN — ONDANSETRON 4 MG: 2 INJECTION INTRAMUSCULAR; INTRAVENOUS at 12:25

## 2022-11-15 RX ADMIN — FENTANYL CITRATE 50 MCG: 0.05 INJECTION, SOLUTION INTRAMUSCULAR; INTRAVENOUS at 11:31

## 2022-11-15 RX ADMIN — PROTAMINE SULFATE 5 MG: 10 INJECTION, SOLUTION INTRAVENOUS at 11:24

## 2022-11-15 RX ADMIN — CEFAZOLIN 2 G: 1 INJECTION, POWDER, FOR SOLUTION INTRAMUSCULAR; INTRAVENOUS at 11:57

## 2022-11-15 RX ADMIN — MIDAZOLAM 2 MG: 1 INJECTION INTRAMUSCULAR; INTRAVENOUS at 07:55

## 2022-11-15 RX ADMIN — FENTANYL CITRATE 50 MCG: 50 INJECTION INTRAMUSCULAR; INTRAVENOUS at 22:32

## 2022-11-15 RX ADMIN — FENTANYL CITRATE 50 MCG: 0.05 INJECTION, SOLUTION INTRAMUSCULAR; INTRAVENOUS at 08:24

## 2022-11-15 RX ADMIN — SODIUM CHLORIDE: 9 INJECTION, SOLUTION INTRAVENOUS at 09:02

## 2022-11-15 RX ADMIN — NICARDIPINE HYDROCHLORIDE 15 MG/HR: 0.1 INJECTION INTRAVENOUS at 13:37

## 2022-11-15 RX ADMIN — ONDANSETRON 4 MG: 2 INJECTION INTRAMUSCULAR; INTRAVENOUS at 16:00

## 2022-11-15 RX ADMIN — ROCURONIUM BROMIDE 10 MG: 10 INJECTION INTRAVENOUS at 09:09

## 2022-11-15 RX ADMIN — ROCURONIUM BROMIDE 10 MG: 10 INJECTION INTRAVENOUS at 09:16

## 2022-11-15 RX ADMIN — SODIUM CHLORIDE, POTASSIUM CHLORIDE, SODIUM LACTATE AND CALCIUM CHLORIDE: 600; 310; 30; 20 INJECTION, SOLUTION INTRAVENOUS at 11:29

## 2022-11-15 RX ADMIN — SODIUM CHLORIDE: 9 INJECTION, SOLUTION INTRAVENOUS at 07:54

## 2022-11-15 RX ADMIN — FENTANYL CITRATE 50 MCG: 50 INJECTION INTRAMUSCULAR; INTRAVENOUS at 14:45

## 2022-11-15 RX ADMIN — FENTANYL CITRATE 50 MCG: 0.05 INJECTION, SOLUTION INTRAMUSCULAR; INTRAVENOUS at 09:30

## 2022-11-15 RX ADMIN — FENTANYL CITRATE 50 MCG: 0.05 INJECTION, SOLUTION INTRAMUSCULAR; INTRAVENOUS at 12:42

## 2022-11-15 RX ADMIN — PROTAMINE SULFATE 5 MG: 10 INJECTION, SOLUTION INTRAVENOUS at 11:25

## 2022-11-15 RX ADMIN — ROCURONIUM BROMIDE 10 MG: 10 INJECTION INTRAVENOUS at 09:45

## 2022-11-15 RX ADMIN — KETOROLAC TROMETHAMINE 30 MG: 30 INJECTION, SOLUTION INTRAMUSCULAR at 16:32

## 2022-11-15 RX ADMIN — SODIUM CHLORIDE, PRESERVATIVE FREE 10 ML: 5 INJECTION INTRAVENOUS at 21:12

## 2022-11-15 RX ADMIN — FENTANYL CITRATE 50 MCG: 0.05 INJECTION, SOLUTION INTRAMUSCULAR; INTRAVENOUS at 08:33

## 2022-11-15 RX ADMIN — HEPARIN SODIUM 6500 UNITS: 1000 INJECTION, SOLUTION INTRAVENOUS; SUBCUTANEOUS at 09:54

## 2022-11-15 RX ADMIN — FENTANYL CITRATE 50 MCG: 0.05 INJECTION, SOLUTION INTRAMUSCULAR; INTRAVENOUS at 08:37

## 2022-11-15 RX ADMIN — PROTAMINE SULFATE 10 MG: 10 INJECTION, SOLUTION INTRAVENOUS at 11:27

## 2022-11-15 RX ADMIN — LIDOCAINE HYDROCHLORIDE 50 MG: 10 INJECTION, SOLUTION EPIDURAL; INFILTRATION; INTRACAUDAL; PERINEURAL at 07:59

## 2022-11-15 RX ADMIN — CEFAZOLIN 2 G: 1 INJECTION, POWDER, FOR SOLUTION INTRAMUSCULAR; INTRAVENOUS at 08:30

## 2022-11-15 RX ADMIN — SODIUM CHLORIDE, PRESERVATIVE FREE 10 ML: 5 INJECTION INTRAVENOUS at 21:11

## 2022-11-15 RX ADMIN — NITROGLYCERIN 10 MCG/MIN: 20 INJECTION INTRAVENOUS at 09:40

## 2022-11-15 RX ADMIN — ROCURONIUM BROMIDE 10 MG: 10 INJECTION INTRAVENOUS at 10:04

## 2022-11-15 RX ADMIN — NICARDIPINE HYDROCHLORIDE 10 MG/HR: 0.1 INJECTION INTRAVENOUS at 15:06

## 2022-11-15 RX ADMIN — MORPHINE SULFATE 2 MG: 2 INJECTION, SOLUTION INTRAMUSCULAR; INTRAVENOUS at 20:35

## 2022-11-15 RX ADMIN — PROPOFOL 150 MG: 10 INJECTION, EMULSION INTRAVENOUS at 07:59

## 2022-11-15 RX ADMIN — PROTAMINE SULFATE 5 MG: 10 INJECTION, SOLUTION INTRAVENOUS at 11:26

## 2022-11-15 RX ADMIN — KETOROLAC TROMETHAMINE 15 MG: 15 INJECTION, SOLUTION INTRAMUSCULAR; INTRAVENOUS at 23:40

## 2022-11-15 RX ADMIN — NICARDIPINE HYDROCHLORIDE 2.5 MG/HR: 0.1 INJECTION INTRAVENOUS at 12:06

## 2022-11-15 RX ADMIN — FENTANYL CITRATE 50 MCG: 0.05 INJECTION, SOLUTION INTRAMUSCULAR; INTRAVENOUS at 12:12

## 2022-11-15 RX ADMIN — ONDANSETRON 4 MG: 2 INJECTION INTRAMUSCULAR; INTRAVENOUS at 22:40

## 2022-11-15 RX ADMIN — HEPARIN SODIUM 1500 UNITS: 1000 INJECTION, SOLUTION INTRAVENOUS; SUBCUTANEOUS at 10:42

## 2022-11-15 RX ADMIN — ROCURONIUM BROMIDE 50 MG: 10 INJECTION INTRAVENOUS at 08:00

## 2022-11-15 RX ADMIN — PROPOFOL 50 MG: 10 INJECTION, EMULSION INTRAVENOUS at 08:36

## 2022-11-15 RX ADMIN — DEXAMETHASONE SODIUM PHOSPHATE 10 MG: 10 INJECTION INTRAMUSCULAR; INTRAVENOUS at 08:35

## 2022-11-15 RX ADMIN — NICARDIPINE HYDROCHLORIDE 10 MG/HR: 0.1 INJECTION INTRAVENOUS at 17:05

## 2022-11-15 RX ADMIN — FENTANYL CITRATE 50 MCG: 0.05 INJECTION, SOLUTION INTRAMUSCULAR; INTRAVENOUS at 12:33

## 2022-11-15 RX ADMIN — ROCURONIUM BROMIDE 10 MG: 10 INJECTION INTRAVENOUS at 11:20

## 2022-11-15 RX ADMIN — ROCURONIUM BROMIDE 10 MG: 10 INJECTION INTRAVENOUS at 10:31

## 2022-11-15 RX ADMIN — NICARDIPINE HYDROCHLORIDE 12.5 MG/HR: 0.1 INJECTION INTRAVENOUS at 21:13

## 2022-11-15 RX ADMIN — NICARDIPINE HYDROCHLORIDE 12.5 MG/HR: 0.1 INJECTION INTRAVENOUS at 22:39

## 2022-11-15 RX ADMIN — BUPIVACAINE HYDROCHLORIDE 50 ML: 2.5 INJECTION, SOLUTION EPIDURAL; INFILTRATION; INTRACAUDAL; PERINEURAL at 12:20

## 2022-11-15 RX ADMIN — PROTAMINE SULFATE 5 MG: 10 INJECTION, SOLUTION INTRAVENOUS at 11:23

## 2022-11-15 RX ADMIN — MORPHINE SULFATE 2 MG: 2 INJECTION, SOLUTION INTRAMUSCULAR; INTRAVENOUS at 13:28

## 2022-11-15 ASSESSMENT — PAIN SCALES - GENERAL
PAINLEVEL_OUTOF10: 8
PAINLEVEL_OUTOF10: 2
PAINLEVEL_OUTOF10: 8
PAINLEVEL_OUTOF10: 8
PAINLEVEL_OUTOF10: 10
PAINLEVEL_OUTOF10: 6
PAINLEVEL_OUTOF10: 8
PAINLEVEL_OUTOF10: 7

## 2022-11-15 ASSESSMENT — PAIN DESCRIPTION - ORIENTATION: ORIENTATION: RIGHT;LEFT

## 2022-11-15 ASSESSMENT — PAIN DESCRIPTION - LOCATION
LOCATION: ABDOMEN;GROIN
LOCATION: ABDOMEN;GROIN

## 2022-11-15 ASSESSMENT — PAIN SCALES - WONG BAKER: WONGBAKER_NUMERICALRESPONSE: 0

## 2022-11-15 ASSESSMENT — LIFESTYLE VARIABLES: SMOKING_STATUS: 1

## 2022-11-15 NOTE — CONSENT
Informed Consent for Blood Component Transfusion Note    I have discussed with the patient the rationale for blood component transfusion; its benefits in treating or preventing fatigue, organ damage, or death; and its risk which includes mild transfusion reactions, rare risk of blood borne infection, or more serious but rare reactions. I have discussed the alternatives to transfusion, including the risk and consequences of not receiving transfusion. The patient had an opportunity to ask questions and had agreed to proceed with transfusion of blood components.     Electronically signed by Nate Wallis MD on 11/15/22 at 12:17 PM EST

## 2022-11-15 NOTE — OP NOTE
Operative Note      Patient: Petra King  YOB: 1965  MRN: [id number]    Date of Procedure: 11/15/2022    Pre-Op Diagnosis: Aortoiliac occlusive disease with severe claudication and occasional rest pain    Post-Op Diagnosis: Same       Procedure: Aortobifemoral bypass graft using a 16 x 8 bifurcated graft end-to-end proximal anastomosis and end-to-side distal anastomoses    Surgeon(s):  Cristina Mccracken MD    Assistant:   Bisi Frazier    Anesthesia: General    Estimated Blood Loss (mL): 756     Complications: None    Specimens:   * No specimens in log *    Implants:  Implant Name Type Inv. Item Serial No.  Lot No. LRB No. Used Action   GRAFT VASC ALBOGRFT POLY 16 MMX8 MM [id number] CM L KNIT BIFUR - [id number] Vascular grafts GRAFT VASC ALBOGRFT POLY 16 MMX8 MM [id number] CM L KNIT BIFUR [id number] [facility] [employer]-WD [id number] N/A 1 Implanted         Drains:   NG/OG/NJ/NE Tube Nasogastric 18 fr Right nostril (Active)       Urinary Catheter 11/15/22 2 Way (Active)       Findings: The proximal aorta was without significant calcification. There was aortic thrombus below the level of the clamp which was removed. The distal aspect of the transected aorta was oversewn as there were 2 large lumbar arteries still being served by that aorta. The distal anastomoses were to relatively clean common femoral arteries bilaterally. The patient had palpable dorsalis pedis and posterior tibial pulses on the left and a palpable posterior tibial pulse on the right at the conclusion of the case which were strong. He developed a weakly palpable dorsalis pedis pulse on the right. His feet were warm and well-perfused without evidence of embolization. I saw no evidence of malignancy in our dissection planes. The inferior border of the left renal vein was encountered and certainly preserved. The left renal arteries are paired as well as the right.   The inferior border of the inferior most left renal artery was encountered and preserved. Detailed Description of Procedure: The patient was brought to the operating room and placed in a supine position with his arms outstretched at 90 degree angles to his body. Anesthesia performed placement of arterial line as well as IV access and a Field catheter was placed. His arms were tucked at his sides. His lower chest abdomen groins and thighs were prepped and draped in a sterile fashion. He received preoperative antibiotics and timeout was held. A mini laparotomy incision was made first to inspect the abdominal cavity for any peritoneal studding of tumor or liver masses. I did not detect any issues in that regard. This was done with a 15 blade around the umbilicus extending approximately 5 to 7 cm on either side of the umbilicus both inferior and superiorly. Bovie cautery was used to dissect down to and through the fascia and indeed the peritoneum. Once that inspection was performed a wet blue towel was packed on the abdominal contents anteriorly and the groins were opened with 15 blade in a transverse fashion. Bovie cautery was used to dissect down to and through the Radha's fascia and a longitudinal dissection ensued down to and through the femoral sheath. The common femoral artery was identified and exposed circumferentially bilaterally up to and including underneath the inguinal ligament and down to the bifurcation. The profundal was not dissected out on either side. There was adequate length of common femoral artery bilaterally to stay on the common femoral artery with the anastomosis bilaterally. A tunnel was developed underneath the inguinal ligament to stay on top of the external iliac artery for approximately 5 cm in length in both groins. The abdominal incision was extended another 5 cm in either direction and the abdominal contents were swept to his right except for the left colon.   The duodenum was then removed from the aorta by incising the retroperitoneum and reflecting the fourth portion of the duodenum and the proximal jejunum towards the patient's right. The bowels were eventually packed in a blue towel and packed underneath of the abdominal wall. At that point an Omni-Tract retractor was used for retraction to expose the aorta. The aorta was exposed on its anterior and lateral surfaces up to the level of the renal arteries. The renal vein was identified and preserved. The inferior mesenteric vein was identified and preserved. Circumferential exposure was achieved just inferior to the renal arteries to place a transverse clamp. Approximately 2 cm of common iliac artery were exposed bilaterally. A tunnel was developed over the common iliac arteries and over top of the external iliac arteries with finger dissection to stay immediately anterior to the common and external iliac arteries. An aortic clamp was then used to develop a tunnel from the femoral incision over the external iliac artery to meet the finger. An umbilical tape was placed in both of those tunnels on the left and the right to preserve that space. There was no bleeding upon making those tunnels. The patient was given 6500 units of heparin. 3 minutes were allowed to elapse before the aorta was clamped immediately inferior to the renal arteries. The aorta was transected with curved Santiago's as well as Erich's scissors. The underside of the proximal aspect of the aorta below the clamp but above the transection was freed up of the connective tissues to allow better mobilization of the proximal aorta. Thrombus was removed both from the proximal and distal aorta and when it was removed from the distal aorta it began to bleed from the lumbar arteries.   An aortic clamp was placed across the distal aorta and the distal aorta was oversewn with 4-0 Prolene in a running horizontal mattress style along the clamp and then a running whipstitch style back up to the anterior wall of the aorta approximately 2 to 3 mm off of the clamp. The suture was tied to itself as the clamp was removed. There was no bleeding at the conclusion of that maneuver. A 16 x 8 graft was already sized with aortic sizers and placed in rifampin and soaked for approximately half an hour. The graft was cut to length proximally and anastomosed to the aorta using 4-0 Prolene in a running fashion from the 9:00 and 3:00 positions. The 9:00 stitch was placed as well as the 3:00 stitch and the 9:00 stitch was then run along the back wall to make the 3:00 stitch and tied to the 3:00 stitch. The 3:00 stitch was then run along the anterior wall to meet the 9:00 stitch and tied to the 9:00 stitch. The anastomosis was tested and there was no bleeding. The aorta was reclamped immediately below the renal arteries and the remaining graft that was cut away originally was cut to length and placed around the anastomosis and a Cumber bun cuff style. There was again no bleeding at that anastomosis and a Rashard Mabelv[place]  was placed just above the bifurcation of the graft to be below the anastomosis. The graft was then tunneled in the previously made tunnels by placing the aortic clamp through those tunnels using the umbilical tape and the grafts were then tunneled through to the groins on both sides. The right common femoral anastomosis was done first by clamping the proximal and distal common femoral artery with profunda clamps and a longitudinal arteriotomy was made with an 11 blade and Peters scissors measuring 3 cm in length. The graft was spatulated and cut to length and anastomosed in a four-quadrant running fashion using 6-0 Prolene on a C1 needle. Antegrade and retrograde flush was achieved both from the common femoral artery and the graft before the graft was opened to the external iliac artery on the right for 10 heartbeats prior to removing the distal clamp while holding pressure on the superficial femoral artery. 10 heartbeats were allowed to elapse before opening the superficial femoral artery by relieving direct pressure. There was an outstanding pulse in the superficial femoral artery 5 cm below the level of the anastomosis when I was holding pressure. There was no bleeding at the anastomosis. The left anastomosis was done also by clamping the common femoral both proximally and distally and a 3 cm arteriotomy was made with an 11 blade and Peters scissors as well. The graft was cut to length and beveled with a spatulated florence and anastomosed also in a four-quadrant running fashion using 6-0 Prolene on a C1 needle. Antegrade and retrograde flush was achieved with ease prior to closing the anastomosis in its entirety and flow was initiated with the same sequence to the external iliac artery first and then to the profunda and SFA. There was no bleeding at this anastomosis either. At that point I inspected the distal pulses and he had a palpable posterior tibial pulse bilaterally with a palpable dorsalis pedis pulse on the left. I could not feel the dorsalis pedis pulse on the right but later I did feel a faint dorsalis pedis pulse on the right. With this knowledge and the knowledge that there was no ischemic intestines including the small and large intestines the retroperitoneum was closed over the graft using 2-0 Vicryl in a running fashion. The colon and rectum were pink and well-perfused with good peristalsis and none of the small bowel had any injury or ischemia. An NG tube was placed into the stomach and palpated by direct palpation. The anterior abdominal wall was then closed at the level of fascia with a running #1 PDS from either apex and tied in the middle. The skin was closed at the abdominal wall level with skin staples.   The groins were closed in layers using 2-0 Vicryl in an interrupted simple style at the level of the femoral sheath, deep subcutaneous tissue, Radha's fascia and the skin was closed with 4-0 nylon in an interrupted vertical mattress style. There was no bleeding at any of the dissection planes or anastomosis upon closure and the patient was stable throughout the case. He was actually controlled with nitroglycerin for hypertension through most of the case. I do not think he required any pressors. He had approximately 200 cc of blood loss throughout the entire to the case and continued to make urine throughout the entirety of the case. All sponge needle and instrument counts were correct and the abdominal wound and groin wounds were dressed with sterile dressings. He was awakened extubated and returned to the recovery room in stable condition. He tolerated the procedure well. There were no complications. He will stay in the ICU until stable to go to the floor.     Electronically signed by Mati Stahl MD on 11/15/2022 at 12:17 PM

## 2022-11-15 NOTE — PROGRESS NOTES
Division of Vascular Surgery        Postoperative Note    Continues to be tachycardic, excellent urine output last few hours >100 ml, clear urine. Abdomen overall soft, tender appropriately, not peritoneal.  +2 DP/PT pulse on left foot, +1 DP/PT on the right. Will continue to follow labs suspect tachycardia related to pain. Will add Toradol and increase breakthrough pain for tonight. Electronically signed by Gregg Galvan MD on 11/15/22 at 4:16 PM [address] [place] [address] North: [phone]  C: [phone]  Email: [email or web address]

## 2022-11-15 NOTE — PROGRESS NOTES
Patient admitted, consent signed and questions answered. Patient ready for procedure. Call light to reach with side rails up 2 of 2  family at bedside with patient. History and physical updated.    Patient shaved nipples to knees Leah at bedside

## 2022-11-15 NOTE — ANESTHESIA POSTPROCEDURE EVALUATION
Department of Anesthesiology  Postprocedure Note    Patient: Virgilio Chirinos  MRN: [id number]  [place]: 1965  Date of evaluation: 11/15/2022      Procedure Summary     Date: 11/15/22 Room / Location: [address]    Anesthesia Start: 7138 Anesthesia Stop: 1250    Procedure: AORTO BIFEMORAL BYPASS, 16MM X 8MM BIFURCATED ALBOGRAFT GRAFT, BLOCK, CELLSAVER Diagnosis:       Aortic occlusion ([address]      (AORTIC ILIAC OCCLUSION DISEASE)    Surgeons: Aric Yang MD Responsible Provider: Ozzy Beard MD    Anesthesia Type: general ASA Status: 3          Anesthesia Type: No value filed.     Brianne Phase I:      Brianne Phase II:        Anesthesia Post Evaluation    Patient location during evaluation: bedside  Patient participation: complete - patient participated  Level of consciousness: awake  Airway patency: patent  Nausea & Vomiting: no nausea and no vomiting  Complications: no  Cardiovascular status: hemodynamically stable  Respiratory status: acceptable  Hydration status: stable  Comments: /69   Pulse (!) 47   Temp 98.2 °F (36.8 °C) (Oral)   Resp (!) 8   Ht 5' 10\" (1.778 m)   Wt 140 lb (63.5 kg)   SpO2 96%   BMI 20.09 kg/m²

## 2022-11-15 NOTE — H&P
VASCULAR SURGERY H&P  [address] [place] [address]      Patient's Name/ Date of Birth/ Gender: Rozina Borges / 1965 (62 y.o.) / male     Surgeon: Dr. Kitty Tong    History of present Illness: Pt is a 62 y.o. male who presents today for aortobifemoral bypass. He has bilateral lower extremity claudication and at times bilateral lower extremity rest pain. He can walk approximately 25 to 50 yards before having significant pain. He is still working for [employer] and still has a requirement of walking quite a bit. He has never had ulcerations in his lower extremities previously. He has not had stroke, TIA or amaurosis fugax. He has never had any heart problems. He continues to work and his claudication is affecting his work. Unfortunately he also has a renal tumor on his left and concomitant nephrectomy and aortobifemoral bypass was considered. Urology decided that I should go ahead with the aortobifemoral bypass first and they would then proceed with nephrectomy later. The patient denies any history of chest pain and has never had coronary artery problems. He has never had stroke, TIA or amaurosis fugax. He is not diabetic. He does not have hypertension. He smokes cigarettes at approximately 1 pack/day. Past Medical History:  has no past medical history on file. Past Surgical History: No past surgical history on file. Social History:  reports that he has been smoking cigarettes. He has never used smokeless tobacco. He reports current drug use. Drug: Marijuana Brianna Pleas). He reports that he does not drink alcohol. Family History: family history is not on file.     Review of Systems:   General: Denies fever, chills, night sweats, weight loss, malaise, fatigue  HEENT: Denies sore throat, sinus problems, allergic rhinosinusitis  Card: Denies chest pain, palpitations, orthopnea/PND  Pulm: Denies cough, shortness of breath  GI: denies history of constipation, diarrhea, hematochezia or melena  : Denies polyuria, dysuria, hematuria  Endo: Denies diabetes, thyroid problems. Heme: Denies anemia, h/o bleeding or clotting problems. Neuro: Denies h/o CVA, TIA  Skin: Denies rashes, ulcers  Musculoskeletal: Bilateral leg pain with walking. Allergies: Patient has no known allergies. Current Meds:No current facility-administered medications for this encounter. Current Outpatient Medications:     nicotine (NICODERM CQ) 14 MG/24HR, Place 1 patch onto the skin every 24 hours (Patient not taking: Reported on 10/28/2022), Disp: , Rfl:     pregabalin (LYRICA) 50 MG capsule, Take 50 mg by mouth in the morning and 50 mg at noon and 50 mg before bedtime. , Disp: , Rfl:     Vital Signs: There were no vitals filed for this visit. Physical Exam:  Vital signs and Nurse's note reviewed  Gen:  A&Ox3, NAD  HEENT: PERRLA, EOMI, no scleral icterus, oral mucosa moist  Neck: Supple  Chest: Symmetric rise with inhalation, no evidence of trauma  CVS: He has no carotid bruits. His chest is clear to auscultation bilaterally. His heart has a regular rate and rhythm with no murmurs rubs or gallops. His abdomen is soft nontender nondistended. His lower extremities are warm and adequately perfused. He has good capillary refill. He has no femoral, popliteal, dorsalis pedis or posterior tibial pulses in either lower extremity. He has no ulceration. He has no gangrene. He has no edema. He has no varicosities. Resp: Good bilateral air entry, clear to auscultation b/l, no wheeze or rhonchi  Abd: soft, non-tender, non-distended, no guarding or rebound.   CNS: Moves all extremities, no gross focal motor deficits    Labs:   No results found for: WBC, HGB, HCT, MCV, PLT  No results found for: NA, K, CL, CO2, BUN, CREATININE, GLUCOSE, CALCIUM  No results found for: ALKPHOS, ALT, AST, PROT, BILITOT, BILIDIR, LABALBU  No results found for: LACTA  No results found for: AMYLASE  No results found for: LIPASE  No results found for: INR    Imaging:  CTA (uploaded on PACS) demonstrates significant infrarenal aortic occlusion extending to the common femoral arteries. There are significant collateral vessels feeding the external and internal iliac arteries as well as femoral arteries bilaterally. Impression: Atherosclerosis of bilateral lower extremities with rest pain and claudication  Aorto-iliac occlusion    Plan: Will proceed to the OR this morning for aortobifemoral bypass. The risks of the procedure were explained to the patient including but not limited to bleeding, infection, hematoma, nerve damage, permanent neurologic disability, limb loss, bowel ischemia, spinal cord ischemia, myocardial infarction, stroke, heart attack and death as well as injury to any intra-abdominal structures. He understands that there is a risk of graft infection which would mandate its removal and possibly even render him with critical limb ischemia and the need for amputation later on. He understands that there is a risk for limb loss with these procedures as well. He wants to go ahead to improve his lower extremity symptoms of claudication and rest pain. He has minimal rest pain but significant claudication. Written consent obtained.   He will be admitted to the ICU post-operatively    Ana Rosa Dumont DO PGY-2  11/15/22 5:39 AM

## 2022-11-15 NOTE — ANESTHESIA PROCEDURE NOTES
Arterial Line:    An arterial line was placed using ultrasound guidance, in the OR for the following indication(s): continuous blood pressure monitoring and blood sampling needed. A 20 gauge (size), 1 and 1/4 inch (length), Arrow (type) catheter was placed, into the right radial artery, secured by Tegaderm. Anesthesia type: General    Events:  patient tolerated procedure well with no complications. 11/15/2022 8:10 AM  Anesthesiologist: Ozzy Beard MD  Resident/CRNA: JC Barrios - PORFIRIO  Other anesthesia staff: Mel Patton RN  Performed:  Other anesthesia staff   Preanesthetic Checklist  Completed: patient identified, IV checked, site marked, risks and benefits discussed, surgical/procedural consents, equipment checked, pre-op evaluation, timeout performed, anesthesia consent given, oxygen available, monitors applied/VS acknowledged, fire risk safety assessment completed and verbalized and blood product R/B/A discussed and consented

## 2022-11-15 NOTE — ANESTHESIA PROCEDURE NOTES
Peripheral Block    Patient location during procedure: OR  Reason for block: post-op pain management and at surgeon's request  Start time: 11/15/2022 12:20 PM  End time: 11/15/2022 12:25 PM  Staffing  Performed: anesthesiologist   Anesthesiologist: Lola Gay MD  Preanesthetic Checklist  Completed: patient identified, IV checked, site marked, risks and benefits discussed, surgical/procedural consents, equipment checked, pre-op evaluation, timeout performed, anesthesia consent given, oxygen available, monitors applied/VS acknowledged, fire risk safety assessment completed and verbalized and blood product R/B/A discussed and consented  Peripheral Block   Patient position: supine  Prep: ChloraPrep  Provider prep: mask and sterile gloves  Patient monitoring: cardiac monitor, continuous pulse ox, continuous capnometry, frequent blood pressure checks, IV access and oxygen  Block type: TAP  Laterality: bilateral  Injection technique: single-shot  Guidance: ultrasound guided    Needle   Needle type: insulated echogenic nerve stimulator needle   Needle localization: ultrasound guidance  Assessment   Injection assessment: negative aspiration for heme and local visualized surrounding nerve on ultrasound  Slow fractionated injection: yes  Hemodynamics: stable  Outcomes: uncomplicated    Medications Administered  bupivacaine (PF) 0.25 % - Perineural   50 mL - 11/15/2022 12:20:00 PM

## 2022-11-15 NOTE — ANESTHESIA PRE PROCEDURE
Department of Anesthesiology  Preprocedure Note       Name:  Samuel Robledo   Age:  62 y.o.  :  1965                                          MRN:  [id number]         Date:  11/15/2022      Surgeon: Yaima Krishna):  Elena Gallagher MD    Procedure: Procedure(s):  AORTO BIFEMORAL BYPASS   **CELL SAVER**   (EPIDURAL BLOCK PRE-OP)    Medications prior to admission:   Prior to Admission medications    Medication Sig Start Date End Date Taking? Authorizing Provider   nicotine (NICODERM CQ) 14 MG/24HR Place 1 patch onto the skin every 24 hours  Patient not taking: Reported on 10/28/2022 10/3/22   Historical Provider, MD   pregabalin (LYRICA) 50 MG capsule Take 50 mg by mouth in the morning and 50 mg at noon and 50 mg before bedtime. Historical Provider, MD       Current medications:    Current Facility-Administered Medications   Medication Dose Route Frequency Provider Last Rate Last Admin    0.9 % sodium chloride infusion   IntraVENous PRN Elena Gallagher MD           Allergies:  No Known Allergies    Problem List:    Patient Active Problem List   Diagnosis Code    Occlusion of aorta ([facility] [address] I70.0       Past Medical History:  No past medical history on file. Past Surgical History:  No past surgical history on file. Social History:    Social History     Tobacco Use    Smoking status: Every Day     Types: Cigarettes    Smokeless tobacco: Never   Substance Use Topics    Alcohol use: Never                                Ready to quit: Not Answered  Counseling given: Not Answered      Vital Signs (Current): There were no vitals filed for this visit.                                            BP Readings from Last 3 Encounters:   10/28/22 129/71   22 117/67       NPO Status:                                                                                 BMI:   Wt Readings from Last 3 Encounters:   10/28/22 141 lb (64 kg)   22 143 lb (64.9 kg)     There is no height or weight on file to calculate BMI.    CBC: No results found for: WBC, RBC, HGB, HCT, MCV, RDW, PLT    CMP: No results found for: NA, K, CL, CO2, BUN, CREATININE, GFRAA, AGRATIO, LABGLOM, GLUCOSE, GLU, PROT, CALCIUM, BILITOT, ALKPHOS, AST, ALT    POC Tests: No results for input(s): POCGLU, POCNA, POCK, POCCL, POCBUN, POCHEMO, POCHCT in the last 72 hours. Coags: No results found for: PROTIME, INR, APTT    HCG (If Applicable): No results found for: PREGTESTUR, PREGSERUM, HCG, HCGQUANT     ABGs: No results found for: PHART, PO2ART, LRW3QXK, VEE0RTL, BEART, A3VFZNDD     Type & Screen (If Applicable):  No results found for: LABABO, LABRH    Drug/Infectious Status (If Applicable):  No results found for: HIV, HEPCAB    COVID-19 Screening (If Applicable): No results found for: COVID19        Anesthesia Evaluation  Patient summary reviewed no history of anesthetic complications:   Airway: Mallampati: II  TM distance: >3 FB   Neck ROM: full  Mouth opening: > = 3 FB   Dental:    (+) poor dentition  Comment: Significantly poor dentition - rotten and decaying teeth. Pulmonary:normal exam    (+) current smoker                           Cardiovascular:  Exercise tolerance: good (>4 METS),                 Stress test reviewed                Neuro/Psych:   Negative Neuro/Psych ROS              GI/Hepatic/Renal:            ROS comment: Renal tumor. .   Endo/Other:    (+) malignancy/cancer. Abdominal:             Vascular:   + PVD, aortic or cerebral, . Other Findings:           Anesthesia Plan      general     ASA 3         arterial line  MIPS: Postoperative opioids intended, Prophylactic antiemetics administered and Postoperative trial extubation. Anesthetic plan and risks discussed with patient. Use of blood products discussed with patient whom consented to blood products. Plan discussed with CRNA.             ABDIAZIZ Ku MD   11/15/2022

## 2022-11-15 NOTE — PLAN OF CARE
Problem: Discharge Planning  Goal: Discharge to home or other facility with appropriate resources  11/15/2022 1855 by Katharina Leiva RN  Outcome: Progressing  11/15/2022 1853 by Katharina Leiva RN  Outcome: Progressing     Problem: Pain  Goal: Verbalizes/displays adequate comfort level or baseline comfort level  11/15/2022 1855 by Katharina Leiva RN  Outcome: Progressing  11/15/2022 1853 by Katharina Leiva RN  Outcome: Progressing

## 2022-11-16 LAB
ABO/RH: NORMAL
ABSOLUTE EOS #: <0.03 K/UL (ref 0–0.44)
ABSOLUTE IMMATURE GRANULOCYTE: 0.18 K/UL (ref 0–0.3)
ABSOLUTE LYMPH #: 1.22 K/UL (ref 1.1–3.7)
ABSOLUTE MONO #: 1.18 K/UL (ref 0.1–1.2)
ANION GAP SERPL CALCULATED.3IONS-SCNC: 8 MMOL/L (ref 9–17)
ANTIBODY SCREEN: NEGATIVE
ARM BAND NUMBER: NORMAL
BASOPHILS # BLD: 0 % (ref 0–2)
BASOPHILS ABSOLUTE: 0.03 K/UL (ref 0–0.2)
BLD PROD TYP BPU: NORMAL
BLD PROD TYP BPU: NORMAL
BPU ID: NORMAL
BPU ID: NORMAL
BUN BLDV-MCNC: 16 MG/DL (ref 6–20)
CALCIUM SERPL-MCNC: 8.1 MG/DL (ref 8.6–10.4)
CHLORIDE BLD-SCNC: 106 MMOL/L (ref 98–107)
CO2: 23 MMOL/L (ref 20–31)
CREAT SERPL-MCNC: 0.7 MG/DL (ref 0.7–1.2)
CROSSMATCH RESULT: NORMAL
CROSSMATCH RESULT: NORMAL
DISPENSE STATUS BLOOD BANK: NORMAL
DISPENSE STATUS BLOOD BANK: NORMAL
EOSINOPHILS RELATIVE PERCENT: 0 % (ref 1–4)
EXPIRATION DATE: NORMAL
GFR SERPL CREATININE-BSD FRML MDRD: >60 ML/MIN/1.73M2
GLUCOSE BLD-MCNC: 125 MG/DL (ref 75–110)
GLUCOSE BLD-MCNC: 126 MG/DL (ref 75–110)
GLUCOSE BLD-MCNC: 133 MG/DL (ref 75–110)
GLUCOSE BLD-MCNC: 149 MG/DL (ref 70–99)
HCT VFR BLD CALC: 34.8 % (ref 40.7–50.3)
HCT VFR BLD CALC: 35.2 % (ref 40.7–50.3)
HCT VFR BLD CALC: 36.8 % (ref 40.7–50.3)
HCT VFR BLD CALC: 36.9 % (ref 40.7–50.3)
HEMOGLOBIN: 11.7 G/DL (ref 13–17)
HEMOGLOBIN: 11.7 G/DL (ref 13–17)
HEMOGLOBIN: 12.1 G/DL (ref 13–17)
HEMOGLOBIN: 12.3 G/DL (ref 13–17)
IMMATURE GRANULOCYTES: 1 %
LYMPHOCYTES # BLD: 7 % (ref 24–43)
MCH RBC QN AUTO: 30.3 PG (ref 25.2–33.5)
MCHC RBC AUTO-ENTMCNC: 32.9 G/DL (ref 28.4–34.8)
MCV RBC AUTO: 92 FL (ref 82.6–102.9)
MONOCYTES # BLD: 7 % (ref 3–12)
NRBC AUTOMATED: 0 PER 100 WBC
PDW BLD-RTO: 14.9 % (ref 11.8–14.4)
PLATELET # BLD: ABNORMAL K/UL (ref 138–453)
PLATELET, FLUORESCENCE: 148 K/UL (ref 138–453)
PLATELET, IMMATURE FRACTION: 5.7 % (ref 1.1–10.3)
POTASSIUM SERPL-SCNC: 4.1 MMOL/L (ref 3.7–5.3)
RBC # BLD: 4 M/UL (ref 4.21–5.77)
RBC # BLD: ABNORMAL 10*6/UL
SEG NEUTROPHILS: 85 % (ref 36–65)
SEGMENTED NEUTROPHILS ABSOLUTE COUNT: 15.4 K/UL (ref 1.5–8.1)
SODIUM BLD-SCNC: 137 MMOL/L (ref 135–144)
TRANSFUSION STATUS: NORMAL
TRANSFUSION STATUS: NORMAL
UNIT DIVISION: 0
UNIT DIVISION: 0
WBC # BLD: 18 K/UL (ref 3.5–11.3)

## 2022-11-16 PROCEDURE — 6360000002 HC RX W HCPCS: Performed by: STUDENT IN AN ORGANIZED HEALTH CARE EDUCATION/TRAINING PROGRAM

## 2022-11-16 PROCEDURE — 2500000003 HC RX 250 WO HCPCS: Performed by: STUDENT IN AN ORGANIZED HEALTH CARE EDUCATION/TRAINING PROGRAM

## 2022-11-16 PROCEDURE — 85025 COMPLETE CBC W/AUTO DIFF WBC: CPT

## 2022-11-16 PROCEDURE — 2700000000 HC OXYGEN THERAPY PER DAY

## 2022-11-16 PROCEDURE — 85014 HEMATOCRIT: CPT

## 2022-11-16 PROCEDURE — 6370000000 HC RX 637 (ALT 250 FOR IP): Performed by: STUDENT IN AN ORGANIZED HEALTH CARE EDUCATION/TRAINING PROGRAM

## 2022-11-16 PROCEDURE — 6360000002 HC RX W HCPCS: Performed by: SURGERY

## 2022-11-16 PROCEDURE — 2580000003 HC RX 258: Performed by: STUDENT IN AN ORGANIZED HEALTH CARE EDUCATION/TRAINING PROGRAM

## 2022-11-16 PROCEDURE — 85018 HEMOGLOBIN: CPT

## 2022-11-16 PROCEDURE — 80048 BASIC METABOLIC PNL TOTAL CA: CPT

## 2022-11-16 PROCEDURE — 85055 RETICULATED PLATELET ASSAY: CPT

## 2022-11-16 PROCEDURE — 94761 N-INVAS EAR/PLS OXIMETRY MLT: CPT

## 2022-11-16 PROCEDURE — 2000000000 HC ICU R&B

## 2022-11-16 RX ORDER — FENTANYL CITRATE 50 UG/ML
50 INJECTION, SOLUTION INTRAMUSCULAR; INTRAVENOUS
Status: DISPENSED | OUTPATIENT
Start: 2022-11-16 | End: 2022-11-16

## 2022-11-16 RX ADMIN — NICARDIPINE HYDROCHLORIDE 10 MG/HR: 0.1 INJECTION INTRAVENOUS at 13:03

## 2022-11-16 RX ADMIN — MORPHINE SULFATE 2 MG: 2 INJECTION, SOLUTION INTRAMUSCULAR; INTRAVENOUS at 11:07

## 2022-11-16 RX ADMIN — SODIUM CHLORIDE, PRESERVATIVE FREE 10 ML: 5 INJECTION INTRAVENOUS at 11:07

## 2022-11-16 RX ADMIN — FENTANYL CITRATE 50 MCG: 50 INJECTION INTRAMUSCULAR; INTRAVENOUS at 11:28

## 2022-11-16 RX ADMIN — MORPHINE SULFATE 2 MG: 2 INJECTION, SOLUTION INTRAMUSCULAR; INTRAVENOUS at 05:16

## 2022-11-16 RX ADMIN — NICARDIPINE HYDROCHLORIDE 10 MG/HR: 0.1 INJECTION INTRAVENOUS at 00:27

## 2022-11-16 RX ADMIN — FENTANYL CITRATE 50 MCG: 50 INJECTION INTRAMUSCULAR; INTRAVENOUS at 14:13

## 2022-11-16 RX ADMIN — KETOROLAC TROMETHAMINE 15 MG: 15 INJECTION, SOLUTION INTRAMUSCULAR; INTRAVENOUS at 17:16

## 2022-11-16 RX ADMIN — SODIUM CHLORIDE, POTASSIUM CHLORIDE, SODIUM LACTATE AND CALCIUM CHLORIDE: 600; 310; 30; 20 INJECTION, SOLUTION INTRAVENOUS at 13:15

## 2022-11-16 RX ADMIN — ENOXAPARIN SODIUM 40 MG: 100 INJECTION SUBCUTANEOUS at 11:02

## 2022-11-16 RX ADMIN — SODIUM CHLORIDE, POTASSIUM CHLORIDE, SODIUM LACTATE AND CALCIUM CHLORIDE: 600; 310; 30; 20 INJECTION, SOLUTION INTRAVENOUS at 22:00

## 2022-11-16 RX ADMIN — NICARDIPINE HYDROCHLORIDE 10 MG/HR: 0.1 INJECTION INTRAVENOUS at 06:44

## 2022-11-16 RX ADMIN — NICARDIPINE HYDROCHLORIDE 10 MG/HR: 0.1 INJECTION INTRAVENOUS at 04:32

## 2022-11-16 RX ADMIN — NICARDIPINE HYDROCHLORIDE 10 MG/HR: 0.1 INJECTION INTRAVENOUS at 15:06

## 2022-11-16 RX ADMIN — SODIUM CHLORIDE, PRESERVATIVE FREE 10 ML: 5 INJECTION INTRAVENOUS at 10:57

## 2022-11-16 RX ADMIN — KETOROLAC TROMETHAMINE 15 MG: 15 INJECTION, SOLUTION INTRAMUSCULAR; INTRAVENOUS at 22:06

## 2022-11-16 RX ADMIN — NICARDIPINE HYDROCHLORIDE 10 MG/HR: 0.1 INJECTION INTRAVENOUS at 10:59

## 2022-11-16 RX ADMIN — KETOROLAC TROMETHAMINE 15 MG: 15 INJECTION, SOLUTION INTRAMUSCULAR; INTRAVENOUS at 05:40

## 2022-11-16 RX ADMIN — KETOROLAC TROMETHAMINE 15 MG: 15 INJECTION, SOLUTION INTRAMUSCULAR; INTRAVENOUS at 10:57

## 2022-11-16 RX ADMIN — MORPHINE SULFATE 2 MG: 2 INJECTION, SOLUTION INTRAMUSCULAR; INTRAVENOUS at 13:03

## 2022-11-16 RX ADMIN — NICARDIPINE HYDROCHLORIDE 10 MG/HR: 0.1 INJECTION INTRAVENOUS at 20:00

## 2022-11-16 RX ADMIN — NICARDIPINE HYDROCHLORIDE 10 MG/HR: 0.1 INJECTION INTRAVENOUS at 02:22

## 2022-11-16 RX ADMIN — NICARDIPINE HYDROCHLORIDE 10 MG/HR: 0.1 INJECTION INTRAVENOUS at 17:16

## 2022-11-16 RX ADMIN — FENTANYL CITRATE 50 MCG: 50 INJECTION INTRAMUSCULAR; INTRAVENOUS at 21:45

## 2022-11-16 RX ADMIN — NICARDIPINE HYDROCHLORIDE 5 MG/HR: 0.1 INJECTION INTRAVENOUS at 22:00

## 2022-11-16 RX ADMIN — NICARDIPINE HYDROCHLORIDE 10 MG/HR: 0.1 INJECTION INTRAVENOUS at 09:03

## 2022-11-16 ASSESSMENT — PAIN DESCRIPTION - LOCATION: LOCATION: GROIN;ABDOMEN

## 2022-11-16 ASSESSMENT — PAIN SCALES - GENERAL
PAINLEVEL_OUTOF10: 8
PAINLEVEL_OUTOF10: 10
PAINLEVEL_OUTOF10: 3
PAINLEVEL_OUTOF10: 10
PAINLEVEL_OUTOF10: 10
PAINLEVEL_OUTOF10: 7
PAINLEVEL_OUTOF10: 7
PAINLEVEL_OUTOF10: 3
PAINLEVEL_OUTOF10: 2
PAINLEVEL_OUTOF10: 0
PAINLEVEL_OUTOF10: 10

## 2022-11-16 NOTE — PROGRESS NOTES
Interval Progress Note:    Discussed hematuria with pt's urologist, Dr. Bran Elmore. He recommends we continue to monitor and RN can irrigate catheter as needed. Pt remains hemodynamically stable today, on 10 of cardene. Pain mostly controlled with IV medications. Continue strict NPO, NG tube to suction, swain catheter, neurovascular checks. Will make changes tomorrow based on overnight events.     Jewell Gaitan, DO PGY-2  11/16/22 5:01 PM

## 2022-11-16 NOTE — PROGRESS NOTES
Vascular Surgery   Progress Note    PATIENT NAME: Harinder Jacob     TODAY'S DATE: 11/16/2022, 11:05 AM    SUBJECTIVE:     Pt seen and examined at bedside. Hematuria overnight without clots. Tachycardia resolved with fluids and pain control. Pt states overall pain is 6/10 when not moving. C/O sore throat from NG tube. Denies fever, chills, nausea, vomiting, chest pain, and SOB. No flatus or bowel movements yet. Reports both legs are warmer and easier to move and stretch. OBJECTIVE:   VITALS:  /70   Pulse 89   Temp 96.8 °F (36 °C) (Axillary)   Resp 17   Ht 5' 10\" (1.778 m)   Wt 140 lb (63.5 kg)   SpO2 97%   BMI 20.09 kg/m²      INTAKE/OUTPUT:      Intake/Output Summary (Last 24 hours) at 11/16/2022 1105  Last data filed at 11/16/2022 0818  Gross per 24 hour   Intake 6423.49 ml   Output 1540 ml   Net 4883.49 ml       PHYSICAL EXAM:  General Appearance: awake, alert, oriented, in no acute distress  HEENT:  Normocephalic, atraumatic, mucus membranes moist   Heart: Regular rate and rhythm  Lungs: normal effort with symmetric rise and fall of chest wall  Abdomen: soft, nondistended, appropriately tender, midline incision with minimal strikethrough bleeding, bilateral groins soft without hematoma  Extremities: RLE - palpable DP and PT pulse (DP faint); LLE - palpable DP, PT pulse  Skin: Skin color, texture, turgor normal. No rashes or lesions. Data:  CBC:   Recent Labs     11/15/22  0734 11/15/22  1319 11/15/22  1704 11/16/22  0059 11/16/22  0558 11/16/22  1042   WBC 9.7 27.8*  --   --  18.0*  --    HGB 13.1 13.7   < > 12.3* 12.1* 11.7*    203  --   --  See Reflexed IPF Result  --     < > = values in this interval not displayed.      Chemistry:   Recent Labs     11/15/22  1317 11/15/22  1319 11/15/22  1704 11/15/22  2344 11/16/22  0558   NA  --  139  --   --  137   K  --  3.8  --   --  4.1   CL  --  105  --   --  106   CO2  --  20  --   --  23   GLUCOSE  --  261*  --   --  149*   BUN  -- 18  --   --  16   CREATININE 0.90 0.87  --   --  0.70   ANIONGAP  --  14  --   --  8*   LABGLOM  --  >60  --   --  >60   CALCIUM  --  8.1*  --   --  8.1*   LACTACIDWB  --   --  3.3* 1.2  --      Hepatic: No results for input(s): AST, ALT, ALB, ALKPHOS, BILITOT, BILIDIR in the last 72 hours. Coagulation: No results for input(s): APTT, PROT, INR in the last 72 hours. Radiology Review:    No results found. ASSESSMENT:  Active Hospital Problems    Diagnosis Date Noted    Occlusion of aorta ([facility] [address] [I70.0] 11/15/2022     Priority: Medium       62 y.o. male with Aorto-iliac occlusive disease POD# 1 s/p Aorto-bifemoral bypass. Plan:  CV/Heme:   Continue cardene for blood pressure control  Tachycardia resolved with fluids and pain control  Hemoglobin stable  Pulm:   encourage incentive spirometer q1 hour  GI:   Continue strict NPO  NG tube to suction. Will consider oral medications tomorrow  Renal:   strict Is/Os  continue swain  hematuria- will discuss with pt's urologist Dr. Chelsea Campos  Neuro:   Pain control: Fentanyl, Toradol  Endo  HDSS, glucose within normal range overnight  ID  White count increased most likely from inflammatory response. Will continue to monitor  Vasc:   Continue neurovascular checks.    DP and PT pulses palpable bilaterally  Prophylaxis: Lovenox to start this morning  Incisions: dressings will be changed POD 2    Electronically signed by Erick Dailey DO  on 11/16/2022 at 11:05 AM

## 2022-11-16 NOTE — CARE COORDINATION
11/16/22 0852   Service Assessment   Patient Orientation Alert and Oriented   Cognition Alert   History Provided By Patient   Primary Caregiver Self   Support Systems Spouse/Significant Other   Patient's Healthcare Decision Maker is: Legal Next of Kin   PCP Verified by CM Yes   Last Visit to PCP Within last 3 months   Prior Functional Level Independent in ADLs/IADLs   Current Functional Level Independent in ADLs/IADLs   Can patient return to prior living arrangement Yes   Ability to make needs known: Good   Family able to assist with home care needs: Yes   Would you like for me to discuss the discharge plan with any other family members/significant others, and if so, who? Yes  (Spouse, if needed.)   Discharge Planning   Type of Residence House   Living Arrangements Spouse/Significant Other   Current Services Prior To Admission None   Potential Assistance Needed N/A  (Needs denied.)   DME Ordered? No   Potential Assistance Purchasing Medications Yes  (If new medication co-pays are expensive. Discussed drug  assistance programs.)   Patient expects to be discharged to: House   One/Two Story Residence Two story   History of falls? 0   Services At/After Discharge   Transition of Care Consult (CM Consult) Discharge Planning   Services At/After Discharge None   Confirm Follow Up Transport Other (see comment)  (Sister to provide transport, spouse does not drive on highways.)   Condition of Participation: Discharge Planning   The Plan for Transition of Care is related to the following treatment goals: to go home. Pt's plan is to home, will have transportation, need PT/OT evals.

## 2022-11-17 LAB
ABSOLUTE EOS #: <0.03 K/UL (ref 0–0.44)
ABSOLUTE IMMATURE GRANULOCYTE: 0.15 K/UL (ref 0–0.3)
ABSOLUTE LYMPH #: 1.06 K/UL (ref 1.1–3.7)
ABSOLUTE MONO #: 1.28 K/UL (ref 0.1–1.2)
ANION GAP SERPL CALCULATED.3IONS-SCNC: 9 MMOL/L (ref 9–17)
BASOPHILS # BLD: 0 % (ref 0–2)
BASOPHILS ABSOLUTE: <0.03 K/UL (ref 0–0.2)
BUN BLDV-MCNC: 17 MG/DL (ref 6–20)
CALCIUM SERPL-MCNC: 8.1 MG/DL (ref 8.6–10.4)
CHLORIDE BLD-SCNC: 106 MMOL/L (ref 98–107)
CO2: 24 MMOL/L (ref 20–31)
CREAT SERPL-MCNC: 0.57 MG/DL (ref 0.7–1.2)
EOSINOPHILS RELATIVE PERCENT: 0 % (ref 1–4)
GFR SERPL CREATININE-BSD FRML MDRD: >60 ML/MIN/1.73M2
GLUCOSE BLD-MCNC: 101 MG/DL (ref 75–110)
GLUCOSE BLD-MCNC: 111 MG/DL (ref 75–110)
GLUCOSE BLD-MCNC: 118 MG/DL (ref 75–110)
GLUCOSE BLD-MCNC: 126 MG/DL (ref 70–99)
GLUCOSE BLD-MCNC: 128 MG/DL (ref 75–110)
HCT VFR BLD CALC: 34.3 % (ref 40.7–50.3)
HCT VFR BLD CALC: 34.5 % (ref 40.7–50.3)
HEMOGLOBIN: 11.5 G/DL (ref 13–17)
HEMOGLOBIN: 11.6 G/DL (ref 13–17)
IMMATURE GRANULOCYTES: 1 %
LYMPHOCYTES # BLD: 6 % (ref 24–43)
MCH RBC QN AUTO: 30.6 PG (ref 25.2–33.5)
MCHC RBC AUTO-ENTMCNC: 33.3 G/DL (ref 28.4–34.8)
MCV RBC AUTO: 91.8 FL (ref 82.6–102.9)
MONOCYTES # BLD: 7 % (ref 3–12)
NRBC AUTOMATED: 0 PER 100 WBC
PDW BLD-RTO: 15 % (ref 11.8–14.4)
PLATELET # BLD: ABNORMAL K/UL (ref 138–453)
PLATELET, FLUORESCENCE: 139 K/UL (ref 138–453)
PLATELET, IMMATURE FRACTION: 6 % (ref 1.1–10.3)
POTASSIUM SERPL-SCNC: 4 MMOL/L (ref 3.7–5.3)
RBC # BLD: 3.76 M/UL (ref 4.21–5.77)
RBC # BLD: ABNORMAL 10*6/UL
SEG NEUTROPHILS: 86 % (ref 36–65)
SEGMENTED NEUTROPHILS ABSOLUTE COUNT: 15.41 K/UL (ref 1.5–8.1)
SODIUM BLD-SCNC: 139 MMOL/L (ref 135–144)
WBC # BLD: 17.9 K/UL (ref 3.5–11.3)

## 2022-11-17 PROCEDURE — 6360000002 HC RX W HCPCS

## 2022-11-17 PROCEDURE — 6360000002 HC RX W HCPCS: Performed by: STUDENT IN AN ORGANIZED HEALTH CARE EDUCATION/TRAINING PROGRAM

## 2022-11-17 PROCEDURE — 85055 RETICULATED PLATELET ASSAY: CPT

## 2022-11-17 PROCEDURE — 80048 BASIC METABOLIC PNL TOTAL CA: CPT

## 2022-11-17 PROCEDURE — 85014 HEMATOCRIT: CPT

## 2022-11-17 PROCEDURE — 85018 HEMOGLOBIN: CPT

## 2022-11-17 PROCEDURE — 6360000002 HC RX W HCPCS: Performed by: SURGERY

## 2022-11-17 PROCEDURE — 82947 ASSAY GLUCOSE BLOOD QUANT: CPT

## 2022-11-17 PROCEDURE — 2000000000 HC ICU R&B

## 2022-11-17 PROCEDURE — 2500000003 HC RX 250 WO HCPCS: Performed by: STUDENT IN AN ORGANIZED HEALTH CARE EDUCATION/TRAINING PROGRAM

## 2022-11-17 PROCEDURE — 85025 COMPLETE CBC W/AUTO DIFF WBC: CPT

## 2022-11-17 PROCEDURE — 6370000000 HC RX 637 (ALT 250 FOR IP): Performed by: STUDENT IN AN ORGANIZED HEALTH CARE EDUCATION/TRAINING PROGRAM

## 2022-11-17 PROCEDURE — 2580000003 HC RX 258: Performed by: STUDENT IN AN ORGANIZED HEALTH CARE EDUCATION/TRAINING PROGRAM

## 2022-11-17 PROCEDURE — 6370000000 HC RX 637 (ALT 250 FOR IP): Performed by: SURGERY

## 2022-11-17 RX ORDER — FENTANYL CITRATE 50 UG/ML
25 INJECTION, SOLUTION INTRAMUSCULAR; INTRAVENOUS
Status: DISCONTINUED | OUTPATIENT
Start: 2022-11-17 | End: 2022-11-19 | Stop reason: HOSPADM

## 2022-11-17 RX ORDER — DOCUSATE SODIUM 100 MG/1
100 CAPSULE, LIQUID FILLED ORAL 2 TIMES DAILY
Status: DISCONTINUED | OUTPATIENT
Start: 2022-11-17 | End: 2022-11-19 | Stop reason: HOSPADM

## 2022-11-17 RX ORDER — LISINOPRIL 10 MG/1
10 TABLET ORAL 2 TIMES DAILY
Status: DISCONTINUED | OUTPATIENT
Start: 2022-11-17 | End: 2022-11-19 | Stop reason: HOSPADM

## 2022-11-17 RX ORDER — DOCUSATE SODIUM 100 MG/1
100 CAPSULE, LIQUID FILLED ORAL DAILY
Status: DISCONTINUED | OUTPATIENT
Start: 2022-11-17 | End: 2022-11-19 | Stop reason: HOSPADM

## 2022-11-17 RX ORDER — LABETALOL HYDROCHLORIDE 5 MG/ML
10 INJECTION, SOLUTION INTRAVENOUS EVERY 4 HOURS
Status: DISCONTINUED | OUTPATIENT
Start: 2022-11-17 | End: 2022-11-19

## 2022-11-17 RX ADMIN — KETOROLAC TROMETHAMINE 15 MG: 15 INJECTION, SOLUTION INTRAMUSCULAR; INTRAVENOUS at 04:14

## 2022-11-17 RX ADMIN — SODIUM CHLORIDE, POTASSIUM CHLORIDE, SODIUM LACTATE AND CALCIUM CHLORIDE: 600; 310; 30; 20 INJECTION, SOLUTION INTRAVENOUS at 04:16

## 2022-11-17 RX ADMIN — FENTANYL CITRATE 25 MCG: 50 INJECTION, SOLUTION INTRAMUSCULAR; INTRAVENOUS at 04:14

## 2022-11-17 RX ADMIN — DOCUSATE SODIUM 100 MG: 100 CAPSULE ORAL at 14:36

## 2022-11-17 RX ADMIN — NICARDIPINE HYDROCHLORIDE 6 MG/HR: 0.1 INJECTION INTRAVENOUS at 04:19

## 2022-11-17 RX ADMIN — MORPHINE SULFATE 2 MG: 2 INJECTION, SOLUTION INTRAMUSCULAR; INTRAVENOUS at 02:18

## 2022-11-17 RX ADMIN — DOCUSATE SODIUM 100 MG: 100 CAPSULE ORAL at 21:24

## 2022-11-17 RX ADMIN — SODIUM CHLORIDE, PRESERVATIVE FREE 10 ML: 5 INJECTION INTRAVENOUS at 21:24

## 2022-11-17 RX ADMIN — PREGABALIN 50 MG: 50 CAPSULE ORAL at 21:24

## 2022-11-17 RX ADMIN — ENOXAPARIN SODIUM 40 MG: 100 INJECTION SUBCUTANEOUS at 09:01

## 2022-11-17 RX ADMIN — NICARDIPINE HYDROCHLORIDE 4 MG/HR: 0.1 INJECTION INTRAVENOUS at 12:48

## 2022-11-17 RX ADMIN — ACETAMINOPHEN 1000 MG: 500 TABLET ORAL at 21:24

## 2022-11-17 RX ADMIN — ACETAMINOPHEN 1000 MG: 500 TABLET ORAL at 12:16

## 2022-11-17 RX ADMIN — PREGABALIN 50 MG: 50 CAPSULE ORAL at 09:06

## 2022-11-17 RX ADMIN — Medication 81 MG: at 09:05

## 2022-11-17 RX ADMIN — PREGABALIN 50 MG: 50 CAPSULE ORAL at 14:36

## 2022-11-17 RX ADMIN — LISINOPRIL 10 MG: 10 TABLET ORAL at 15:29

## 2022-11-17 ASSESSMENT — PAIN SCALES - GENERAL
PAINLEVEL_OUTOF10: 7
PAINLEVEL_OUTOF10: 8
PAINLEVEL_OUTOF10: 2
PAINLEVEL_OUTOF10: 8

## 2022-11-17 NOTE — PLAN OF CARE
Problem: Discharge Planning  Goal: Discharge to home or other facility with appropriate resources  11/17/2022 1008 by Mavis Choi  Outcome: Progressing  11/17/2022 0607 by Mick Palmer RN  Outcome: Progressing  Flowsheets (Taken 11/16/2022 2000)  Discharge to home or other facility with appropriate resources:   Identify barriers to discharge with patient and caregiver   Arrange for needed discharge resources and transportation as appropriate   Identify discharge learning needs (meds, wound care, etc)   Arrange for interpreters to assist at discharge as needed   Refer to discharge planning if patient needs post-hospital services based on physician order or complex needs related to functional status, cognitive ability or social support system     Problem: Pain  Goal: Verbalizes/displays adequate comfort level or baseline comfort level  11/17/2022 1008 by Mavis Choi  Outcome: Progressing  11/17/2022 0607 by Mick Palmer RN  Outcome: Progressing  Flowsheets (Taken 11/16/2022 2000)  Verbalizes/displays adequate comfort level or baseline comfort level:   Encourage patient to monitor pain and request assistance   Assess pain using appropriate pain scale   Administer analgesics based on type and severity of pain and evaluate response   Implement non-pharmacological measures as appropriate and evaluate response   Consider cultural and social influences on pain and pain management   Notify Licensed Independent Practitioner if interventions unsuccessful or patient reports new pain     Problem: Skin/Tissue Integrity  Goal: Absence of new skin breakdown  Description: 1. Monitor for areas of redness and/or skin breakdown  2. Assess vascular access sites hourly  3. Every 4-6 hours minimum:  Change oxygen saturation probe site  4. Every 4-6 hours:  If on nasal continuous positive airway pressure, respiratory therapy assess nares and determine need for appliance change or resting period.   11/17/2022 1008 by Sherri Harris  Outcome: Progressing  11/17/2022 0607 by Samara Linda RN  Outcome: Progressing     Problem: ABCDS Injury Assessment  Goal: Absence of physical injury  11/17/2022 1008 by Huma De Los Santos  Outcome: Progressing  11/17/2022 0607 by Samara Linda RN  Outcome: Progressing

## 2022-11-17 NOTE — PROGRESS NOTES
SPIRITUAL CARE DEPARTMENT - [facility]  PROGRESS NOTE    Shift date: 11.16.2022  Shift day: Wednesday   Shift # 2    Room # 1002/1002-01   Name: Toño Espinosa                Pentecostal: unknown   Place of Caodaism: unknown    Referral: Routine Visit    Admit Date & Time: 11/15/2022  6:00 AM    Assessment:  Toño Espinosa is a 62 y.o. male in the hospital. Upon entering the room writer observes patient calm and coping. Patient anticipates being discharged on Saturday. Intervention:  Writer introduced self and title as  Writer offered space for the patient  to express feelings, needs, and concerns and provided a ministry presence. Outcome:  Patient remains calm and coping. Plan:  Chaplains will remain available to offer spiritual and emotional support as needed.       Electronically signed by Sadiq Best on 11/17/2022 at 2:43 AM.  [facility]  [phone]       11/16/22 1830   Encounter Summary   Service Provided For: Patient   Referral/Consult From: [facility] Family members   Last Encounter  11/16/22   Complexity of Encounter Moderate   Begin Time 1830   End Time  1845   Total Time Calculated 15 min   Encounter    Type Initial Screen/Assessment   Assessment/Intervention/Outcome   Assessment Calm;Coping   Intervention Active listening;Explored/Affirmed feelings, thoughts, concerns   Outcome Coping     Electronically signed by Amelie Peña on 11/17/2022 at 2:43 AM

## 2022-11-17 NOTE — PROGRESS NOTES
Vascular Surgery   Progress Note    PATIENT NAME: Pj Sheldon     TODAY'S DATE: 11/17/2022, 6:12 PM    SUBJECTIVE:     Pt seen and examined at bedside. Hematuria overnight again. Patient reports his pain is well controlled. He denies nausea, vomiting, chest pain, shortness of breath. He has passed gas and had a few small, hard bowel movements last night. OBJECTIVE:   VITALS:  BP (!) 142/68   Pulse 84   Temp 99.1 °F (37.3 °C) (Oral)   Resp 18   Ht 5' 10\" (1.778 m)   Wt 156 lb 15.5 oz (71.2 kg)   SpO2 94%   BMI 22.52 kg/m²      INTAKE/OUTPUT:      Intake/Output Summary (Last 24 hours) at 11/17/2022 1812  Last data filed at 11/17/2022 1730  Gross per 24 hour   Intake 3027.86 ml   Output 3200 ml   Net -172.14 ml         PHYSICAL EXAM:  General Appearance: awake, alert, oriented, in no acute distress  HEENT:  Normocephalic, atraumatic, mucus membranes moist   Heart: Regular rate and rhythm  Lungs: normal effort with symmetric rise and fall of chest wall  Abdomen: Midline incision clean dry and intact with staples in place. Groin incisions also clean, dry, intact with suture. No evidence of hematoma. Extremities: RLE - palpable DP and PT pulse (DP faint); LLE - palpable DP, PT pulse  Skin: Skin color, texture, turgor normal. No rashes or lesions. Data:  CBC:   Recent Labs     11/15/22  1319 11/15/22  1704 11/16/22  0558 11/16/22  1042 11/16/22  1731 11/17/22  0039 11/17/22  0558   WBC 27.8*  --  18.0*  --   --   --  17.9*   HGB 13.7   < > 12.1*   < > 11.7* 11.6* 11.5*     --  See Reflexed IPF Result  --   --   --  See Reflexed IPF Result    < > = values in this interval not displayed.        Chemistry:   Recent Labs     11/15/22  1319 11/15/22  1704 11/15/22  2344 11/16/22  0558 11/17/22  0558     --   --  137 139   K 3.8  --   --  4.1 4.0     --   --  106 106   CO2 20  --   --  23 24   GLUCOSE 261*  --   --  149* 126*   BUN 18  --   --  16 17   CREATININE 0.87  --   --  0.70 0.57*   ANIONGAP 14  --   --  8* 9   LABGLOM >60  --   --  >60 >60   CALCIUM 8.1*  --   --  8.1* 8.1*   LACTACIDWB  --  3.3* 1.2  --   --        Hepatic: No results for input(s): AST, ALT, ALB, ALKPHOS, BILITOT, BILIDIR in the last 72 hours. Coagulation: No results for input(s): APTT, PROT, INR in the last 72 hours. Radiology Review:    No results found. ASSESSMENT:  Active Hospital Problems    Diagnosis Date Noted    Occlusion of aorta ([facility] [address] [I70.0] 11/15/2022     Priority: Medium       62 y.o. male with Aorto-iliac occlusive disease POD# 2 s/p Aorto-bifemoral bypass. Plan:  CV/Heme:   Wean off Cardene and use p.o. labetalol for blood pressure control  Hemoglobin stable  Pulm:   encourage incentive spirometer q1 hour  GI:   Continue NPO, okay for oral medications  NG tube removed  Renal:   strict Is/Os  continue swain  hematuria-Dr. Nataliia Joseph aware and advised to monitor  Neuro:   Pain control: Tylenol, Lyrica, Zakia, morphine  Endo  HDSS, glucose within normal range   ID  Follow-up a.m. labs tomorrow  Vasc:   Continue neurovascular checks. DP and PT pulses palpable bilaterally  Prophylaxis: Lovenox and aspirin started  Incisions: Dressing changed this morning.   RN to do daily dressing changes and as needed    Electronically signed by [place] OfficerDO  on 11/17/2022 at 6:12 PM

## 2022-11-17 NOTE — PLAN OF CARE
Problem: Discharge Planning  Goal: Discharge to home or other facility with appropriate resources  11/17/2022 0607 by Gallo Chavez RN  Outcome: Progressing  Flowsheets (Taken 11/16/2022 2000)  Discharge to home or other facility with appropriate resources:   Identify barriers to discharge with patient and caregiver   Arrange for needed discharge resources and transportation as appropriate   Identify discharge learning needs (meds, wound care, etc)   Arrange for interpreters to assist at discharge as needed   Refer to discharge planning if patient needs post-hospital services based on physician order or complex needs related to functional status, cognitive ability or social support system  11/16/2022 1641 by Rosy Fulton RN  Outcome: Progressing     Problem: Pain  Goal: Verbalizes/displays adequate comfort level or baseline comfort level  11/17/2022 0607 by Gallo Chavez RN  Outcome: Progressing  Flowsheets (Taken 11/16/2022 2000)  Verbalizes/displays adequate comfort level or baseline comfort level:   Encourage patient to monitor pain and request assistance   Assess pain using appropriate pain scale   Administer analgesics based on type and severity of pain and evaluate response   Implement non-pharmacological measures as appropriate and evaluate response   Consider cultural and social influences on pain and pain management   Notify Licensed Independent Practitioner if interventions unsuccessful or patient reports new pain  11/16/2022 1641 by Rosy Fulton RN  Outcome: Progressing     Problem: Skin/Tissue Integrity  Goal: Absence of new skin breakdown  Description: 1. Monitor for areas of redness and/or skin breakdown  2. Assess vascular access sites hourly  3. Every 4-6 hours minimum:  Change oxygen saturation probe site  4.   Every 4-6 hours:  If on nasal continuous positive airway pressure, respiratory therapy assess nares and determine need for appliance change or resting period.   11/17/2022 1538 by Fernie Birmingham RN  Outcome: Progressing  11/16/2022 1641 by Gianfranco Lewis RN  Outcome: Progressing     Problem: ABCDS Injury Assessment  Goal: Absence of physical injury  11/17/2022 0607 by Fernie Birmingham RN  Outcome: Progressing  11/16/2022 1641 by Gianfranco Lewis RN  Outcome: Progressing

## 2022-11-18 LAB
ABSOLUTE EOS #: <0.03 K/UL (ref 0–0.44)
ABSOLUTE IMMATURE GRANULOCYTE: 0.07 K/UL (ref 0–0.3)
ABSOLUTE LYMPH #: 1.21 K/UL (ref 1.1–3.7)
ABSOLUTE MONO #: 0.63 K/UL (ref 0.1–1.2)
ANION GAP SERPL CALCULATED.3IONS-SCNC: 9 MMOL/L (ref 9–17)
BASOPHILS # BLD: 0 % (ref 0–2)
BASOPHILS ABSOLUTE: <0.03 K/UL (ref 0–0.2)
BUN BLDV-MCNC: 16 MG/DL (ref 6–20)
CALCIUM SERPL-MCNC: 8.2 MG/DL (ref 8.6–10.4)
CHLORIDE BLD-SCNC: 104 MMOL/L (ref 98–107)
CO2: 23 MMOL/L (ref 20–31)
CREAT SERPL-MCNC: 0.54 MG/DL (ref 0.7–1.2)
EOSINOPHILS RELATIVE PERCENT: 0 % (ref 1–4)
GFR SERPL CREATININE-BSD FRML MDRD: >60 ML/MIN/1.73M2
GLUCOSE BLD-MCNC: 101 MG/DL (ref 75–110)
GLUCOSE BLD-MCNC: 112 MG/DL (ref 75–110)
GLUCOSE BLD-MCNC: 115 MG/DL (ref 75–110)
GLUCOSE BLD-MCNC: 126 MG/DL (ref 75–110)
GLUCOSE BLD-MCNC: 93 MG/DL (ref 70–99)
HCT VFR BLD CALC: 34.5 % (ref 40.7–50.3)
HEMOGLOBIN: 11.6 G/DL (ref 13–17)
IMMATURE GRANULOCYTES: 1 %
LYMPHOCYTES # BLD: 10 % (ref 24–43)
MCH RBC QN AUTO: 30.5 PG (ref 25.2–33.5)
MCHC RBC AUTO-ENTMCNC: 33.6 G/DL (ref 28.4–34.8)
MCV RBC AUTO: 90.8 FL (ref 82.6–102.9)
MONOCYTES # BLD: 5 % (ref 3–12)
NRBC AUTOMATED: 0 PER 100 WBC
PDW BLD-RTO: 14.8 % (ref 11.8–14.4)
PLATELET # BLD: 133 K/UL (ref 138–453)
PMV BLD AUTO: 10.5 FL (ref 8.1–13.5)
POTASSIUM SERPL-SCNC: 4.2 MMOL/L (ref 3.7–5.3)
RBC # BLD: 3.8 M/UL (ref 4.21–5.77)
RBC # BLD: ABNORMAL 10*6/UL
SEG NEUTROPHILS: 85 % (ref 36–65)
SEGMENTED NEUTROPHILS ABSOLUTE COUNT: 10.76 K/UL (ref 1.5–8.1)
SODIUM BLD-SCNC: 136 MMOL/L (ref 135–144)
WBC # BLD: 12.7 K/UL (ref 3.5–11.3)

## 2022-11-18 PROCEDURE — 80048 BASIC METABOLIC PNL TOTAL CA: CPT

## 2022-11-18 PROCEDURE — 36415 COLL VENOUS BLD VENIPUNCTURE: CPT

## 2022-11-18 PROCEDURE — 85025 COMPLETE CBC W/AUTO DIFF WBC: CPT

## 2022-11-18 PROCEDURE — 2580000003 HC RX 258: Performed by: STUDENT IN AN ORGANIZED HEALTH CARE EDUCATION/TRAINING PROGRAM

## 2022-11-18 PROCEDURE — 6370000000 HC RX 637 (ALT 250 FOR IP): Performed by: STUDENT IN AN ORGANIZED HEALTH CARE EDUCATION/TRAINING PROGRAM

## 2022-11-18 PROCEDURE — 2500000003 HC RX 250 WO HCPCS: Performed by: STUDENT IN AN ORGANIZED HEALTH CARE EDUCATION/TRAINING PROGRAM

## 2022-11-18 PROCEDURE — 6360000002 HC RX W HCPCS: Performed by: STUDENT IN AN ORGANIZED HEALTH CARE EDUCATION/TRAINING PROGRAM

## 2022-11-18 PROCEDURE — 82947 ASSAY GLUCOSE BLOOD QUANT: CPT

## 2022-11-18 PROCEDURE — 6370000000 HC RX 637 (ALT 250 FOR IP): Performed by: SURGERY

## 2022-11-18 PROCEDURE — 2060000000 HC ICU INTERMEDIATE R&B

## 2022-11-18 RX ADMIN — ENOXAPARIN SODIUM 40 MG: 100 INJECTION SUBCUTANEOUS at 08:43

## 2022-11-18 RX ADMIN — LISINOPRIL 10 MG: 10 TABLET ORAL at 16:04

## 2022-11-18 RX ADMIN — Medication 81 MG: at 08:34

## 2022-11-18 RX ADMIN — PREGABALIN 50 MG: 50 CAPSULE ORAL at 20:53

## 2022-11-18 RX ADMIN — ACETAMINOPHEN 1000 MG: 500 TABLET ORAL at 20:54

## 2022-11-18 RX ADMIN — PREGABALIN 50 MG: 50 CAPSULE ORAL at 08:34

## 2022-11-18 RX ADMIN — ACETAMINOPHEN 1000 MG: 500 TABLET ORAL at 05:37

## 2022-11-18 RX ADMIN — DOCUSATE SODIUM 100 MG: 100 CAPSULE ORAL at 20:53

## 2022-11-18 RX ADMIN — Medication 10 MG: at 08:44

## 2022-11-18 RX ADMIN — SODIUM CHLORIDE, PRESERVATIVE FREE 10 ML: 5 INJECTION INTRAVENOUS at 20:54

## 2022-11-18 RX ADMIN — PREGABALIN 50 MG: 50 CAPSULE ORAL at 13:24

## 2022-11-18 RX ADMIN — Medication 10 MG: at 13:25

## 2022-11-18 RX ADMIN — LISINOPRIL 10 MG: 10 TABLET ORAL at 08:34

## 2022-11-18 RX ADMIN — DOCUSATE SODIUM 100 MG: 100 CAPSULE ORAL at 08:34

## 2022-11-18 RX ADMIN — Medication 10 MG: at 16:18

## 2022-11-18 RX ADMIN — ACETAMINOPHEN 1000 MG: 500 TABLET ORAL at 13:24

## 2022-11-18 RX ADMIN — Medication 10 MG: at 20:54

## 2022-11-18 ASSESSMENT — PAIN SCALES - GENERAL
PAINLEVEL_OUTOF10: 1
PAINLEVEL_OUTOF10: 2

## 2022-11-18 NOTE — CARE COORDINATION
Met with patient to discuss transitional planning. He is returning home independently.  He denies needs

## 2022-11-18 NOTE — PLAN OF CARE
Urology notified about gross hematuria. Patient known to Dr. Julia Thomason for history of an atrophic kidney with plans for nephrectomy at a later date. Urine is light pink with no clots noted. He can be void trial tomorrow. Bladder scan after voiding document postvoid residual in the chart. He will need an outpatient cystoscopy; this will be scheduled. Please call with any questions or concerns.     Carlitos Trotter MD- [place]

## 2022-11-18 NOTE — PROGRESS NOTES
Division of Vascular Surgery      Vascular Surgery Progress Note            PATIENT NAME: Aman Colmenares   TODAY'S DATE: 2022, 7:10 AM    SUBJECTIVE:    Pt seen and examined. No acute events overnight. Pain is well controlled. Pt had large bowel movement earlier this morning. Pt states he is hungry and is ready to begin eating regular food. He can now lift his legs without having to pull them up with his arms. Field is still in place with hematuria. OBJECTIVE:   VITALS:  /78   Pulse 53   Temp 98 °F (36.7 °C) (Oral)   Resp 18   Ht 5' 10\" (1.778 m)   Wt 156 lb 15.5 oz (71.2 kg)   SpO2 94%   BMI 22.52 kg/m²  I Temperature Max - Temp  Av.7 °F (37.1 °C)  Min: 98 °F (36.7 °C)  Max: 99.8 °F (37.7 °C)    TOTAL INTAKE OVER 24 HOURS:  In: 2081.9 [P.O.:640; I.V.:1441.9]  Out: 2825   TOTAL OUTPUT OVER 24 HOURS:  In: 2081. 9   Out: 2825 [Urine:2825]    URINARY CATHETER OUTPUT (Field):  Urinary Catheter 11/15/22 2 Way-Output (mL): 600 mL    LABS:  Lab Results   Component Value Date/Time    WBC 17.9 2022 05:58 AM    HGB 11.5 2022 05:58 AM    HCT 34.5 2022 05:58 AM    PLT See Reflexed IPF Result 2022 05:58 AM       Lab Results   Component Value Date/Time     2022 04:46 AM    K 4.2 2022 04:46 AM     2022 04:46 AM    CO2 23 2022 04:46 AM    BUN 16 2022 04:46 AM    CREATININE 0.54 2022 04:46 AM    GLUCOSE 93 2022 04:46 AM       No results found for: ALKPHOS, ALT, AST, PROT, BILITOT, BILIDIR, IBILI, LABALBU    No results found for: PROTIME, INR    Calcium:    Lab Results   Component Value Date/Time    CALCIUM 8.2 2022 04:46 AM       Radiology:  No results found. Physical Exam  General: AOx3, NAD  Heart: Regular rate and rhythm without gallops/rubs/murmurs  Lungs: Clear to auscultation bilaterally  Abdomen: Soft, appropriately tender.  Midline incision with staples intact and bilateral inguinal incision dressings are intact with no drainage. Extremity: Bilateral DP and PT pulses palpable, feet are warm and well perfused      Assessment/Plan  62year old male with Aorto-iliac occlusive disease. POD #3 s/p Aorto-bifemoral bypass    CV/Heme  Hg stable at this time (11.5 from 11.6)  BP stable without medications  Ok to remove arterial line  Pulmonary  Encourage incintive spirometry  GI  Advance to regular diet  Colace  Renal  Strict I&O  Continue Field pending urology eval  Hematuria-Urology will assess today   Neuro  Pain management: Tylenol; Lyrica; Roxicodone  Endocrine  HDSS  Glucose within normal range  ID  WBC 17.9 from 18.0 from 27.8  Vascular  Continue neurovascular checks  DP and PT pulses palpable bilaterally  DVT Prophylaxis  Lovenox and ASA  Wound management  RN to do daily dressing changes and if needed     Luh Eason  on 11/18/2022 at [address] [place] [address]  Vascular Service      General Surgery Resident Statement/Addendum  I have discussed the case, including pertinent history and exam findings with the above medical student. I have personally seen the patient. Pt seen and examined at bedside. I performed both history and physical exam.      Note was edited and changes made by me. Assessment and plan reviewed and changes made by me. I agree with the assessment and plan as stated above.     Camryn Failing, DO PGY-2  11/18/22 7:58 AM

## 2022-11-19 VITALS
TEMPERATURE: 98.4 F | DIASTOLIC BLOOD PRESSURE: 78 MMHG | OXYGEN SATURATION: 98 % | WEIGHT: 156.97 LBS | RESPIRATION RATE: 18 BRPM | BODY MASS INDEX: 22.47 KG/M2 | HEIGHT: 70 IN | HEART RATE: 61 BPM | SYSTOLIC BLOOD PRESSURE: 135 MMHG

## 2022-11-19 LAB
GLUCOSE BLD-MCNC: 92 MG/DL (ref 75–110)
GLUCOSE BLD-MCNC: 96 MG/DL (ref 75–110)

## 2022-11-19 PROCEDURE — 2580000003 HC RX 258: Performed by: STUDENT IN AN ORGANIZED HEALTH CARE EDUCATION/TRAINING PROGRAM

## 2022-11-19 PROCEDURE — 6370000000 HC RX 637 (ALT 250 FOR IP): Performed by: SURGERY

## 2022-11-19 PROCEDURE — 51798 US URINE CAPACITY MEASURE: CPT

## 2022-11-19 PROCEDURE — 6370000000 HC RX 637 (ALT 250 FOR IP): Performed by: STUDENT IN AN ORGANIZED HEALTH CARE EDUCATION/TRAINING PROGRAM

## 2022-11-19 PROCEDURE — 6360000002 HC RX W HCPCS: Performed by: STUDENT IN AN ORGANIZED HEALTH CARE EDUCATION/TRAINING PROGRAM

## 2022-11-19 PROCEDURE — 82947 ASSAY GLUCOSE BLOOD QUANT: CPT

## 2022-11-19 RX ORDER — ASPIRIN 81 MG/1
81 TABLET ORAL DAILY
Qty: 30 TABLET | Refills: 3 | Status: SHIPPED | OUTPATIENT
Start: 2022-11-19

## 2022-11-19 RX ORDER — LISINOPRIL 10 MG/1
10 TABLET ORAL 2 TIMES DAILY
Qty: 30 TABLET | Refills: 3 | Status: SHIPPED | OUTPATIENT
Start: 2022-11-19

## 2022-11-19 RX ADMIN — ENOXAPARIN SODIUM 40 MG: 100 INJECTION SUBCUTANEOUS at 07:55

## 2022-11-19 RX ADMIN — PREGABALIN 50 MG: 50 CAPSULE ORAL at 07:55

## 2022-11-19 RX ADMIN — SODIUM CHLORIDE, PRESERVATIVE FREE 10 ML: 5 INJECTION INTRAVENOUS at 07:56

## 2022-11-19 RX ADMIN — Medication 81 MG: at 07:55

## 2022-11-19 RX ADMIN — LISINOPRIL 10 MG: 10 TABLET ORAL at 07:59

## 2022-11-19 RX ADMIN — DOCUSATE SODIUM 100 MG: 100 CAPSULE ORAL at 07:55

## 2022-11-19 NOTE — FLOWSHEET NOTE
Dc instructions reviewed with the pt, he and his significant other verbalized understanding of the instructions  Plan for dc to home,   Iv dc d no complications

## 2022-11-19 NOTE — PROGRESS NOTES
Division of Vascular Surgery      Vascular Surgery Progress Note            PATIENT NAME: Oswald Hamlin   TODAY'S DATE: 2022, 7:41 AM    SUBJECTIVE:    Pt seen and examined. No acute events overnight. Pain is well controlled. Pt tolerating regular diet, having bowel movements and passing gas. He has walked around the unit multiple times throughout the night. Field in place with hematuria. Pt reports he feels ready to go home today     OBJECTIVE:   VITALS:  BP (!) 126/99   Pulse 78   Temp 97.4 °F (36.3 °C) (Oral)   Resp 20   Ht 5' 10\" (1.778 m)   Wt 156 lb 15.5 oz (71.2 kg)   SpO2 98%   BMI 22.52 kg/m²  I Temperature Max - Temp  Av.8 °F (36.6 °C)  Min: 97.4 °F (36.3 °C)  Max: 98.6 °F (37 °C)    TOTAL INTAKE OVER 24 HOURS:  In: 800 [P.O.:800]  Out: 1875   TOTAL OUTPUT OVER 24 HOURS:  In: 800   Out: 1875 [Urine:1875]    URINARY CATHETER OUTPUT (Field):  Urinary Catheter 11/15/22 2 Way-Output (mL): 725 mL    LABS:  Lab Results   Component Value Date/Time    WBC 12.7 2022 10:17 AM    HGB 11.6 2022 10:17 AM    HCT 34.5 2022 10:17 AM     2022 10:17 AM       Lab Results   Component Value Date/Time     2022 04:46 AM    K 4.2 2022 04:46 AM     2022 04:46 AM    CO2 23 2022 04:46 AM    BUN 16 2022 04:46 AM    CREATININE 0.54 2022 04:46 AM    GLUCOSE 93 2022 04:46 AM       No results found for: ALKPHOS, ALT, AST, PROT, BILITOT, BILIDIR, IBILI, LABALBU    No results found for: PROTIME, INR    Calcium:    Lab Results   Component Value Date/Time    CALCIUM 8.2 2022 04:46 AM       Radiology:  No results found. Physical Exam  General: AOx3, NAD  Heart: Regular rate and rhythm  Lungs: Clear to auscultation bilaterally  Abdomen: Soft, appropriately tender. Midline incision with staples intact and bilateral inguinal incision dressings are intact with no drainage.    Extremity: Bilateral DP and PT pulses palpable, feet are warm and well perfused, bilateral ankles with edema    Assessment/Plan  62year old male with Aorto-iliac occlusive disease s/p Aorto-bifemoral bypass.    Discussed with Urology, plan to remove swain this morning for void trial. Please record post-void residual. Pt will follow-up with Dr. Omid Hare in office and plan for cystoscopy  If voiding on his own, will plan for discharge after lunch today  Continue ASA and start 2.5 mg Xarelto twice daily  F/u with Dr. Wendy Gonsales as outpatient in 1 week    Hellen Israel DO PGY-2  11/19/22 7:41 AM

## 2022-11-19 NOTE — DISCHARGE INSTRUCTIONS
Ok to shower with midline staples and groin stitches. The staples and stitches will be removed in the office in 1-2 weeks. [id number] [address] for regular diet. Please call Dr. Mariam Villalobos office for follow-up appointment.

## 2022-11-19 NOTE — CONSENT
Informed Consent for Blood Component Transfusion Note    I have discussed with the patient the rationale for blood component transfusion; its benefits in treating or preventing fatigue, organ damage, or death; and its risk which includes mild transfusion reactions, rare risk of blood borne infection, or more serious but rare reactions. I have discussed the alternatives to transfusion, including the risk and consequences of not receiving transfusion. The patient had an opportunity to ask questions and had agreed to proceed with transfusion of blood components.     Electronically signed by Kole Urena DO on 11/19/22 at 12:43 PM EST

## 2022-11-19 NOTE — DISCHARGE SUMMARY
Vascular Surgery Discharge Summary     Patient Identification  Triston Alan is a 62 y.o. male. :  1965  Admit Date:  11/15/2022    Discharge date:   2022                                  Disposition: home    Discharge Diagnoses:   Patient Active Problem List   Diagnosis    Occlusion of aorta ([address]       Condition on discharge: Stable    Consults: Urology    Surgery: Aorto bifemoral bypass     Patient Instructions: Activity: no heavy lifting, pushing, pulling for 6 weeks, no driving for 2 weeks or while on analgesics  Diet: As tolerated  Follow-up with Dr. Brie Munoz in 2 weeks. See pre-printed instructions in chart and given to patient upon discharge. Discharge Medications:        Medication List        START taking these medications      apixaban 2.5 MG Tabs tablet  Commonly known as: Eliquis  Take 1 tablet by mouth 2 times daily     aspirin 81 MG EC tablet  Take 1 tablet by mouth daily     lisinopril 10 MG tablet  Commonly known as: PRINIVIL;ZESTRIL  Take 1 tablet by mouth in the morning and 1 tablet in the evening. CONTINUE taking these medications      pregabalin 50 MG capsule  Commonly known as: LYRICA            ASK your doctor about these medications      nicotine 14 MG/24HR  Commonly known as: Ansley Killer               Where to Get Your Medications        These medications were sent to [facility] [id number] Andrey Juarez, [address]  [address] [place] [address]      Phone: [phone]   apixaban 2.5 MG Tabs tablet  aspirin 81 MG EC tablet  lisinopril 10 MG tablet          HPI and Hospital Course:   62 y.o. male presented on 11/15/2022 for Aorto -bifemoral bypass. Pt recovered well post-operatively. Pt did have hematuria and this was discussed with his Urologist. Ultimately decided to do void trial and pt will have outpatient cystoscopy.     On day of discharge pt was tolerating regular diet, pain controlled with oral medications and ambulating without difficulty. He will start Eliquis 2.5 mg BID and stay on ASA.       Electronically signed by Kole Urena DO on 11/19/2022 at 8:58 AM

## 2022-11-21 ENCOUNTER — TELEPHONE (OUTPATIENT)
Dept: VASCULAR SURGERY | Age: 57
End: 2022-11-21

## 2022-11-21 NOTE — TELEPHONE ENCOUNTER
Patient states hes having blood in his right eye not sure what he could of done just wanted you to know.

## 2022-12-09 ENCOUNTER — OFFICE VISIT (OUTPATIENT)
Dept: VASCULAR SURGERY | Age: 57
End: 2022-12-09

## 2022-12-09 VITALS
OXYGEN SATURATION: 99 % | DIASTOLIC BLOOD PRESSURE: 73 MMHG | RESPIRATION RATE: 17 BRPM | WEIGHT: 145 LBS | TEMPERATURE: 97.3 F | HEIGHT: 70 IN | SYSTOLIC BLOOD PRESSURE: 130 MMHG | HEART RATE: 57 BPM | BODY MASS INDEX: 20.76 KG/M2

## 2022-12-09 DIAGNOSIS — I70.213 ATHEROSCLEROSIS OF NATIVE ARTERY OF BOTH LOWER EXTREMITIES WITH INTERMITTENT CLAUDICATION (HCC): Primary | ICD-10-CM

## 2022-12-09 DIAGNOSIS — Z09 POSTOPERATIVE EXAMINATION: ICD-10-CM

## 2022-12-09 PROCEDURE — 99024 POSTOP FOLLOW-UP VISIT: CPT | Performed by: SURGERY

## 2022-12-09 NOTE — PROGRESS NOTES
Postop Progress Note    Subjective    Hilda Galeazzi presents to the office for postop follow up. Recall that he had an aortobifemoral bypass for severe lifestyle limiting claudication affecting his work. He is very pleased with the initial results and is able to walk as far as he wants. He continues to have his renal mass which needs to be addressed in the near future. He has that being taken care of by urology in the very near future. He is here to have his staples and sutures removed. He has recovered very well and is even playing out with his band. He wants to know if he can go back to work. Objective    Vitals:    12/09/22 1100   BP: 130/73   Pulse: 57   Resp: 17   Temp: 97.3 °F (36.3 °C)   SpO2: 99%     General: alert, cooperative and no distress  Incision: healing well. His abdominal incision has healed well without any evidence of dehiscence. His sutures and staples are intact. He has no groin hematoma. He has no seroma. He has no erythema or signs of infection. There is no drainage. Assessment  Doing well postoperatively. Plan  At this point we will remove all of his sutures and staples and have him back to the office in 1 month to 6 weeks with JULIO and PVR as well as aortoiliac duplex to investigate the graft and make certain that it is performing and for a baseline study. He understands and agrees and we will see him at that time.     Electronically signed by Len Mckoy MD on 12/9/2022 at 11:25 AM

## 2023-01-09 RX ORDER — SODIUM CHLORIDE, SODIUM LACTATE, POTASSIUM CHLORIDE, CALCIUM CHLORIDE 600; 310; 30; 20 MG/100ML; MG/100ML; MG/100ML; MG/100ML
1000 INJECTION, SOLUTION INTRAVENOUS CONTINUOUS
OUTPATIENT
Start: 2023-01-09

## 2023-01-11 ENCOUNTER — HOSPITAL ENCOUNTER (OUTPATIENT)
Dept: PREADMISSION TESTING | Age: 58
Discharge: HOME | End: 2023-01-11
Payer: COMMERCIAL

## 2023-01-11 VITALS
HEIGHT: 70 IN | SYSTOLIC BLOOD PRESSURE: 138 MMHG | DIASTOLIC BLOOD PRESSURE: 79 MMHG | WEIGHT: 136 LBS | BODY MASS INDEX: 19.47 KG/M2 | HEART RATE: 82 BPM | RESPIRATION RATE: 18 BRPM | TEMPERATURE: 97.7 F | OXYGEN SATURATION: 97 %

## 2023-01-11 LAB
ABO/RH: NORMAL
ANION GAP SERPL CALCULATED.3IONS-SCNC: 8 MMOL/L (ref 9–17)
ANTIBODY SCREEN: NEGATIVE
ARM BAND NUMBER: NORMAL
BUN BLDV-MCNC: 15 MG/DL (ref 6–20)
CHLORIDE BLD-SCNC: 105 MMOL/L (ref 98–107)
CO2: 27 MMOL/L (ref 20–31)
CREAT SERPL-MCNC: 0.77 MG/DL (ref 0.7–1.2)
EXPIRATION DATE: NORMAL
GFR SERPL CREATININE-BSD FRML MDRD: >60 ML/MIN/1.73M2
GLUCOSE BLD-MCNC: 102 MG/DL (ref 70–99)
HCT VFR BLD CALC: 38.8 % (ref 40.7–50.3)
HEMOGLOBIN: 12.6 G/DL (ref 13–17)
MCH RBC QN AUTO: 29.6 PG (ref 25.2–33.5)
MCHC RBC AUTO-ENTMCNC: 32.5 G/DL (ref 28.4–34.8)
MCV RBC AUTO: 91.1 FL (ref 82.6–102.9)
NRBC AUTOMATED: 0 PER 100 WBC
PDW BLD-RTO: 14.3 % (ref 11.8–14.4)
PLATELET # BLD: 172 K/UL (ref 138–453)
PMV BLD AUTO: 10.4 FL (ref 8.1–13.5)
POTASSIUM SERPL-SCNC: 3.9 MMOL/L (ref 3.7–5.3)
RBC # BLD: 4.26 M/UL (ref 4.21–5.77)
SODIUM BLD-SCNC: 140 MMOL/L (ref 135–144)
WBC # BLD: 8.9 K/UL (ref 3.5–11.3)

## 2023-01-11 PROCEDURE — 82947 ASSAY GLUCOSE BLOOD QUANT: CPT

## 2023-01-11 PROCEDURE — 86900 BLOOD TYPING SEROLOGIC ABO: CPT

## 2023-01-11 PROCEDURE — 84520 ASSAY OF UREA NITROGEN: CPT

## 2023-01-11 PROCEDURE — 93005 ELECTROCARDIOGRAM TRACING: CPT | Performed by: STUDENT IN AN ORGANIZED HEALTH CARE EDUCATION/TRAINING PROGRAM

## 2023-01-11 PROCEDURE — 85027 COMPLETE CBC AUTOMATED: CPT

## 2023-01-11 PROCEDURE — 86901 BLOOD TYPING SEROLOGIC RH(D): CPT

## 2023-01-11 PROCEDURE — 86850 RBC ANTIBODY SCREEN: CPT

## 2023-01-11 PROCEDURE — 82565 ASSAY OF CREATININE: CPT

## 2023-01-11 PROCEDURE — 80051 ELECTROLYTE PANEL: CPT

## 2023-01-11 PROCEDURE — 87086 URINE CULTURE/COLONY COUNT: CPT

## 2023-01-11 PROCEDURE — 36415 COLL VENOUS BLD VENIPUNCTURE: CPT

## 2023-01-11 ASSESSMENT — PAIN DESCRIPTION - FREQUENCY: FREQUENCY: CONTINUOUS

## 2023-01-11 ASSESSMENT — PAIN DESCRIPTION - LOCATION: LOCATION: BACK

## 2023-01-11 ASSESSMENT — PAIN DESCRIPTION - PAIN TYPE: TYPE: CHRONIC PAIN

## 2023-01-11 NOTE — H&P
History and Physical    Pt Name: Manas Briones  MRN: [id number]  YOB: 1965  Date of evaluation: 2023  Primary Care Physician: On File Not (Inactive)    SUBJECTIVE:   History of Chief Complaint:    Manas Briones is a 62 y.o. male who presents for PAT appointment. Patient complains of left renal mass. He is s/p AAA repair on 11/15/22 with Dr. Andi Deleon and has been granted vascular clearance with the ok to hold Eliquis indefinitely (copy of this in media). Patient has been scheduled for XI ROBOTIC LAPAROSCOPIC RADICAL NEPHRECTOMY - Left. Allergies  is allergic to adhesive tape. Medications  Prior to Admission medications    Medication Sig Start Date End Date Taking? Authorizing Provider   aspirin 81 MG EC tablet Take 1 tablet by mouth daily 22   Meredith Sood,      Past Medical History    has a past medical history of Arthritis, Atherosclerosis of artery of both lower extremities ([address] Awareness under anesthesia, Chronic back pain, COVID-19 vaccination not done, History of pleurisy, Hx of blood clots, Left anterior fascicular block (LAFB), Left renal mass, Pain management, Poor dentition, RBBB, S/P AAA repair, and Wellness examination. Past Surgical History   has a past surgical history that includes back surgery (); Abdominal aortic aneurysm repair (N/A, 11/15/2022); cardiovascular stress test (2022); and other surgical history (). Social History   reports that he quit smoking about 8 weeks ago. His smoking use included cigarettes. He smoked an average of .5 packs per day. He has never used smokeless tobacco.    reports that he does not currently use alcohol. reports current drug use. Drug: Marijuana Montiel Hotter). VAPES IT  Marital Status    Children 3  Occupation auto detailing   Family History  Family Status   Relation Name Status    Mother      Father       family history includes Cancer in his mother; Coronary Art Dis in his mother.     Review of Systems:  CONSTITUTIONAL:   negative for fevers, chills, fatigue and malaise    EYES:   negative for double vision, blurred vision and photophobia    HEENT:   negative for tinnitus, epistaxis and sore throat     RESPIRATORY:   negative for cough, shortness of breath, wheezing     CARDIOVASCULAR:   negative for chest pain, palpitations, syncope, edema     GASTROINTESTINAL:   negative for nausea, vomiting     GENITOURINARY:   negative for incontinence  SEE HPI   MUSCULOSKELETAL:   +chronic pain, f/w pain management [facility]   NEUROLOGICAL:   Negative for weakness and tingling  negative for headaches and dizziness     PSYCHIATRIC:   negative for anxiety       OBJECTIVE:   VITALS:  height is 5' 10\" (1.778 m) and weight is 136 lb (61.7 kg). His infrared temperature is 97.7 °F (36.5 °C). His blood pressure is 138/79 and his pulse is 82. His respiration is 18 and oxygen saturation is 97%. CONSTITUTIONAL:alert & oriented x 3, no acute distress. Friendly. Very pleasant. Thin. SKIN:  Warm and dry, no rashes on exposed areas of skin   HEAD:  Normocephalic, atraumatic   EYES: EOMs intact. PERRL. EARS:  Equal bilaterally, no edema or thickening, skin is intact without lumps or lesions. No discharge. Hearing grossly WNL. NOSE:  Nares patent. No rhinorrhea   MOUTH/THROAT:  Mucous membranes moist, tongue is pink, uvula midline, poor dentition  NECK:full ROM  LUNGS: Respirations even and non-labored. Clear to auscultation bilaterally, no wheezes, rales, or rhonchi. CARDIOVASCULAR: Regular rate and rhythm, no murmurs/rubs/gallops   ABDOMEN: soft, non-tender, non-distended, bowel sounds active x 4, thin, well healed abdominal vertical scar (s/p AAA repair 2022)  EXTREMITIES: No edema bilateral lower extremities. No varicosities bilateral lower extremities. NEUROLOGIC: CN II-XII are grossly intact.  Gait is smooth, rhythmic and effortless   Testing:   EK23  Labs pending: drawn 2023   IMPRESSIONS: Left renal mass  PLANS:   XI ROBOTIC LAPAROSCOPIC RADICAL NEPHRECTOMY - Left    JC Pak - CNP  Electronically signed 1/11/2023 at 4:26 PM'

## 2023-01-11 NOTE — H&P (VIEW-ONLY)
History and Physical    Pt Name: Gabe Ehcols  MRN: [id number]  YOB: 1965  Date of evaluation: 2023  Primary Care Physician: On File Not (Inactive)    SUBJECTIVE:   History of Chief Complaint:    Gabe Echols is a 62 y.o. male who presents for PAT appointment. Patient complains of left renal mass. He is s/p AAA repair on 11/15/22 with Dr. Sandra Chen and has been granted vascular clearance with the ok to hold Eliquis indefinitely (copy of this in media). Patient has been scheduled for XI ROBOTIC LAPAROSCOPIC RADICAL NEPHRECTOMY - Left. Allergies  is allergic to adhesive tape. Medications  Prior to Admission medications    Medication Sig Start Date End Date Taking? Authorizing Provider   aspirin 81 MG EC tablet Take 1 tablet by mouth daily 22   Larry Dryer, DO     Past Medical History    has a past medical history of Arthritis, Atherosclerosis of artery of both lower extremities ([address] Awareness under anesthesia, Chronic back pain, COVID-19 vaccination not done, History of pleurisy, Hx of blood clots, Left anterior fascicular block (LAFB), Left renal mass, Pain management, Poor dentition, RBBB, S/P AAA repair, and Wellness examination. Past Surgical History   has a past surgical history that includes back surgery (); Abdominal aortic aneurysm repair (N/A, 11/15/2022); cardiovascular stress test (2022); and other surgical history (). Social History   reports that he quit smoking about 8 weeks ago. His smoking use included cigarettes. He smoked an average of .5 packs per day. He has never used smokeless tobacco.    reports that he does not currently use alcohol. reports current drug use. Drug: Marijuana Thuy Lux). VAPES IT  Marital Status    Children 3  Occupation auto detailing   Family History  Family Status   Relation Name Status    Mother      Father       family history includes Cancer in his mother; Coronary Art Dis in his mother.     Review of Systems:  CONSTITUTIONAL:   negative for fevers, chills, fatigue and malaise    EYES:   negative for double vision, blurred vision and photophobia    HEENT:   negative for tinnitus, epistaxis and sore throat     RESPIRATORY:   negative for cough, shortness of breath, wheezing     CARDIOVASCULAR:   negative for chest pain, palpitations, syncope, edema     GASTROINTESTINAL:   negative for nausea, vomiting     GENITOURINARY:   negative for incontinence  SEE HPI   MUSCULOSKELETAL:   +chronic pain, f/w pain management [facility]   NEUROLOGICAL:   Negative for weakness and tingling  negative for headaches and dizziness     PSYCHIATRIC:   negative for anxiety       OBJECTIVE:   VITALS:  height is 5' 10\" (1.778 m) and weight is 136 lb (61.7 kg). His infrared temperature is 97.7 °F (36.5 °C). His blood pressure is 138/79 and his pulse is 82. His respiration is 18 and oxygen saturation is 97%. CONSTITUTIONAL:alert & oriented x 3, no acute distress. Friendly. Very pleasant. Thin. SKIN:  Warm and dry, no rashes on exposed areas of skin   HEAD:  Normocephalic, atraumatic   EYES: EOMs intact. PERRL. EARS:  Equal bilaterally, no edema or thickening, skin is intact without lumps or lesions. No discharge. Hearing grossly WNL. NOSE:  Nares patent. No rhinorrhea   MOUTH/THROAT:  Mucous membranes moist, tongue is pink, uvula midline, poor dentition  NECK:full ROM  LUNGS: Respirations even and non-labored. Clear to auscultation bilaterally, no wheezes, rales, or rhonchi. CARDIOVASCULAR: Regular rate and rhythm, no murmurs/rubs/gallops   ABDOMEN: soft, non-tender, non-distended, bowel sounds active x 4, thin, well healed abdominal vertical scar (s/p AAA repair 2022)  EXTREMITIES: No edema bilateral lower extremities. No varicosities bilateral lower extremities. NEUROLOGIC: CN II-XII are grossly intact.  Gait is smooth, rhythmic and effortless   Testing:   EK23  Labs pending: drawn 2023   IMPRESSIONS: Left renal mass  PLANS:   XI ROBOTIC LAPAROSCOPIC RADICAL NEPHRECTOMY - Left    Lizzette JC Zuluaga - CNP  Electronically signed 1/11/2023 at 4:26 PM'

## 2023-01-11 NOTE — PROGRESS NOTES
Anesthesia Focused Assessment    Hx of anesthesia complications:  awareness toward end of pain pump removal in 2012 (states woke up when he \"was being sewn up\"  Family hx of anesthesia complications:  no    METS functional capacity:   -Moderate/Excellent: >4 climbing >/= 1 flight of stairs without stopping or walking up hill  >1-2 blocks    Prior + Covid-19 test? no  Covid vaccinated?: no      STOP-BANG Sleep Apnea Questionnaire    SNORE loudly (heard through closed doors)? NO  TIRED, fatigued, sleepy during daytime? Yes  OBSERVED stopping breathing during sleep? No  High blood PRESSURE or being treated? No    BMI over 35? No  AGE over 48? Yes  NECK circumference over 16\"? No  GENDER (male)? Yes             Total 3  High risk 5-8  Intermediate risk 3-4  Low risk 0-2    ----------------------------------------------------------------------------------------------------------------------  JEM:                                             no  If yes, machine?:                              Type 1 DM:                                   no  T2DM:                                           no    Coronary Artery Disease:             not that he is aware of, denies prior cardiac cath, no prior cardiologist, but surgeon has ordered cardiac clearance and vascular clearance. EKG on 1/12/23  NSR, RBBB, LAFB, Bifascicular block   When compared with ECG of 15-NOV-2022 12:55,   Vent. rate has decreased BY  39 BPM   QT has shortened     Stress test at [facility] on 6/21/22, he states pre-operatively-   Impression: \" Negative lexiscan myocardial perfusion imaging study for stress induced ischemia. No chest pain, ekg changes from baseline(nonspecific anterior ST elevation, unchanged from previous and no change with stress), arrhythmias, transient ischemic dilitation or true fixed or reversible myocardial perfusion imaging defects.  The calculated EF is 67%\"    S/p AAA on 11/15/22 with  [facility], vascular clearance on file with the ok to hold Eliquis indefinitely (copy of this in media)    Hypertension:                               no  Defib / AICD / Pacemaker:           no      Renal Failure:                               no  If yes, on dialysis? :                           Active smoker:                              no tobacco, quit in 11/2022  Drinks Alcohol:                              no  Illicit drugs:                                    vapes marijuana    Dentition:                                      poor    Past Medical History:   Diagnosis Date    Arthritis     Atherosclerosis of artery of both lower extremities ([address]     Awareness under anesthesia 2012    states toward the end of his pain pump removal in [facility] 2012    Chronic back pain     COVID-19 vaccination not done     History of pleurisy     Hx of blood clots     bladder post abd aortic aneurysm repair    Left anterior fascicular block (LAFB)     Left renal mass     Pain management     [facility]    Poor dentition     RBBB     S/P AAA repair 11/15/2022    Dr. Mann Smith, has been granted vascular clearance, also has upcoming f/u appt on 1/20/23    Wellness examination     Dr. Felicia Pulliam PCP Vitaly Nephjo ann last visit 1/2023       Patient was evaluated in PAT & anesthesia guidelines were applied. NPO guidelines, medication instructions and scheduled arrival time were reviewed with patient. I phone Dr. Yoli Baca office to inquire about diet/bowel prep instructions, and left VM to contact patient with these directions. I advised patient/patient family to please contact surgeons office, ahead of time if possible, if any new signs or symptoms of illness, infection, rash, etc. Patient/ patient family verbalize understanding. Anesthesia contacted:   yes. I spoke with Dr. Beulah Deutsch.  Patient history and pertinent findings reviewed (as documented above) and that cardiac clearance has been ordered per surgeon. Anesthesia has Instructed to obtain cardiac clearance.   Medical or cardiac clearance ordered: cardiac    JC PARISH CNP  Electronically signed 1/12/2023 at 9:32 AM

## 2023-01-12 LAB
CULTURE: NORMAL
EKG ATRIAL RATE: 70 BPM
EKG P AXIS: 71 DEGREES
EKG P-R INTERVAL: 144 MS
EKG Q-T INTERVAL: 412 MS
EKG QRS DURATION: 140 MS
EKG QTC CALCULATION (BAZETT): 444 MS
EKG R AXIS: -72 DEGREES
EKG T AXIS: 38 DEGREES
EKG VENTRICULAR RATE: 70 BPM
SPECIMEN DESCRIPTION: NORMAL

## 2023-01-12 PROCEDURE — 93010 ELECTROCARDIOGRAM REPORT: CPT | Performed by: INTERNAL MEDICINE

## 2023-01-12 RX ORDER — HEPARIN SODIUM 5000 [USP'U]/ML
5000 INJECTION, SOLUTION INTRAVENOUS; SUBCUTANEOUS ONCE
OUTPATIENT
Start: 2023-01-12 | End: 2023-01-12

## 2023-01-12 NOTE — PROGRESS NOTES
Cardiology clearance requested from anesthesia. Writer called PCP. Pt will need referral to cardiologist. Information faxed to PCP office, Dr Mcdonnell Lab office (with phone call to [facility]) and Joe Braxton called pt to inform him of the information.

## 2023-01-16 ENCOUNTER — HOSPITAL ENCOUNTER (OUTPATIENT)
Dept: ULTRASOUND IMAGING | Age: 58
Discharge: HOME | End: 2023-01-18
Payer: COMMERCIAL

## 2023-01-16 ENCOUNTER — HOSPITAL ENCOUNTER (OUTPATIENT)
Dept: VASCULAR LAB | Age: 58
Discharge: HOME | End: 2023-01-18
Payer: COMMERCIAL

## 2023-01-16 DIAGNOSIS — I70.213 ATHEROSCLEROSIS OF NATIVE ARTERY OF BOTH LOWER EXTREMITIES WITH INTERMITTENT CLAUDICATION (HCC): ICD-10-CM

## 2023-01-16 DIAGNOSIS — I71.40 ABDOMINAL AORTIC ANEURYSM (AAA), UNSPECIFIED PART, UNSPECIFIED WHETHER RUPTURED: ICD-10-CM

## 2023-01-16 PROCEDURE — 93978 VASCULAR STUDY: CPT

## 2023-01-16 PROCEDURE — 93923 UPR/LXTR ART STDY 3+ LVLS: CPT

## 2023-01-24 ENCOUNTER — ANESTHESIA EVENT (OUTPATIENT)
Dept: OPERATING ROOM | Age: 58
End: 2023-01-24
Payer: COMMERCIAL

## 2023-01-25 ENCOUNTER — HOSPITAL ENCOUNTER (INPATIENT)
Age: 58
LOS: 1 days | Discharge: HOME | End: 2023-01-26
Attending: UROLOGY | Admitting: UROLOGY
Payer: COMMERCIAL

## 2023-01-25 ENCOUNTER — ANESTHESIA (OUTPATIENT)
Dept: OPERATING ROOM | Age: 58
End: 2023-01-25
Payer: COMMERCIAL

## 2023-01-25 DIAGNOSIS — G89.18 POST-OP PAIN: Primary | ICD-10-CM

## 2023-01-25 DIAGNOSIS — N28.89 RENAL MASS: ICD-10-CM

## 2023-01-25 LAB
ANION GAP SERPL CALCULATED.3IONS-SCNC: 11 MMOL/L (ref 9–17)
BUN BLDV-MCNC: 11 MG/DL (ref 6–20)
CALCIUM SERPL-MCNC: 9 MG/DL (ref 8.6–10.4)
CHLORIDE BLD-SCNC: 106 MMOL/L (ref 98–107)
CO2: 21 MMOL/L (ref 20–31)
CREAT SERPL-MCNC: 0.77 MG/DL (ref 0.7–1.2)
GFR SERPL CREATININE-BSD FRML MDRD: >60 ML/MIN/1.73M2
GLUCOSE BLD-MCNC: 134 MG/DL (ref 75–110)
GLUCOSE BLD-MCNC: 188 MG/DL (ref 75–110)
GLUCOSE BLD-MCNC: 234 MG/DL (ref 70–99)
HCT VFR BLD CALC: 39.6 % (ref 40.7–50.3)
HEMOGLOBIN: 12.7 G/DL (ref 13–17)
MCH RBC QN AUTO: 29.3 PG (ref 25.2–33.5)
MCHC RBC AUTO-ENTMCNC: 32.1 G/DL (ref 28.4–34.8)
MCV RBC AUTO: 91.2 FL (ref 82.6–102.9)
NRBC AUTOMATED: 0 PER 100 WBC
PDW BLD-RTO: 14.5 % (ref 11.8–14.4)
PLATELET # BLD: 215 K/UL (ref 138–453)
PMV BLD AUTO: 10.6 FL (ref 8.1–13.5)
POTASSIUM SERPL-SCNC: 4.2 MMOL/L (ref 3.7–5.3)
RBC # BLD: 4.34 M/UL (ref 4.21–5.77)
SODIUM BLD-SCNC: 138 MMOL/L (ref 135–144)
WBC # BLD: 18.6 K/UL (ref 3.5–11.3)

## 2023-01-25 PROCEDURE — 80048 BASIC METABOLIC PNL TOTAL CA: CPT

## 2023-01-25 PROCEDURE — 7100000000 HC PACU RECOVERY - FIRST 15 MIN: Performed by: UROLOGY

## 2023-01-25 PROCEDURE — 3600000019 HC SURGERY ROBOT ADDTL 15MIN: Performed by: UROLOGY

## 2023-01-25 PROCEDURE — 2500000003 HC RX 250 WO HCPCS: Performed by: UROLOGY

## 2023-01-25 PROCEDURE — 2580000003 HC RX 258: Performed by: UROLOGY

## 2023-01-25 PROCEDURE — 6360000002 HC RX W HCPCS: Performed by: STUDENT IN AN ORGANIZED HEALTH CARE EDUCATION/TRAINING PROGRAM

## 2023-01-25 PROCEDURE — 6360000002 HC RX W HCPCS: Performed by: UROLOGY

## 2023-01-25 PROCEDURE — 3700000001 HC ADD 15 MINUTES (ANESTHESIA): Performed by: UROLOGY

## 2023-01-25 PROCEDURE — 3700000000 HC ANESTHESIA ATTENDED CARE: Performed by: UROLOGY

## 2023-01-25 PROCEDURE — 85027 COMPLETE CBC AUTOMATED: CPT

## 2023-01-25 PROCEDURE — 6370000000 HC RX 637 (ALT 250 FOR IP): Performed by: STUDENT IN AN ORGANIZED HEALTH CARE EDUCATION/TRAINING PROGRAM

## 2023-01-25 PROCEDURE — 88342 IMHCHEM/IMCYTCHM 1ST ANTB: CPT

## 2023-01-25 PROCEDURE — 2720000010 HC SURG SUPPLY STERILE: Performed by: UROLOGY

## 2023-01-25 PROCEDURE — 88307 TISSUE EXAM BY PATHOLOGIST: CPT

## 2023-01-25 PROCEDURE — 88341 IMHCHEM/IMCYTCHM EA ADD ANTB: CPT

## 2023-01-25 PROCEDURE — 2580000003 HC RX 258: Performed by: STUDENT IN AN ORGANIZED HEALTH CARE EDUCATION/TRAINING PROGRAM

## 2023-01-25 PROCEDURE — 1200000000 HC SEMI PRIVATE

## 2023-01-25 PROCEDURE — 6360000002 HC RX W HCPCS

## 2023-01-25 PROCEDURE — 82947 ASSAY GLUCOSE BLOOD QUANT: CPT

## 2023-01-25 PROCEDURE — 8E0W4CZ ROBOTIC ASSISTED PROCEDURE OF TRUNK REGION, PERCUTANEOUS ENDOSCOPIC APPROACH: ICD-10-PCS | Performed by: UROLOGY

## 2023-01-25 PROCEDURE — 6360000002 HC RX W HCPCS: Performed by: NURSE ANESTHETIST, CERTIFIED REGISTERED

## 2023-01-25 PROCEDURE — 6360000002 HC RX W HCPCS: Performed by: ANESTHESIOLOGY

## 2023-01-25 PROCEDURE — 3600000009 HC SURGERY ROBOT BASE: Performed by: UROLOGY

## 2023-01-25 PROCEDURE — 2709999900 HC NON-CHARGEABLE SUPPLY: Performed by: UROLOGY

## 2023-01-25 PROCEDURE — 0TT14ZZ RESECTION OF LEFT KIDNEY, PERCUTANEOUS ENDOSCOPIC APPROACH: ICD-10-PCS | Performed by: UROLOGY

## 2023-01-25 PROCEDURE — S2900 ROBOTIC SURGICAL SYSTEM: HCPCS | Performed by: UROLOGY

## 2023-01-25 PROCEDURE — 2500000003 HC RX 250 WO HCPCS: Performed by: NURSE ANESTHETIST, CERTIFIED REGISTERED

## 2023-01-25 PROCEDURE — 87086 URINE CULTURE/COLONY COUNT: CPT

## 2023-01-25 PROCEDURE — 7100000001 HC PACU RECOVERY - ADDTL 15 MIN: Performed by: UROLOGY

## 2023-01-25 RX ORDER — MORPHINE SULFATE 2 MG/ML
2 INJECTION, SOLUTION INTRAMUSCULAR; INTRAVENOUS EVERY 4 HOURS PRN
Status: DISCONTINUED | OUTPATIENT
Start: 2023-01-25 | End: 2023-01-26 | Stop reason: HOSPADM

## 2023-01-25 RX ORDER — SODIUM CHLORIDE 0.9 % (FLUSH) 0.9 %
5-40 SYRINGE (ML) INJECTION PRN
Status: DISCONTINUED | OUTPATIENT
Start: 2023-01-25 | End: 2023-01-26 | Stop reason: HOSPADM

## 2023-01-25 RX ORDER — MEPERIDINE HYDROCHLORIDE 50 MG/ML
12.5 INJECTION INTRAMUSCULAR; INTRAVENOUS; SUBCUTANEOUS EVERY 5 MIN PRN
Status: DISCONTINUED | OUTPATIENT
Start: 2023-01-25 | End: 2023-01-25 | Stop reason: HOSPADM

## 2023-01-25 RX ORDER — OXYCODONE HYDROCHLORIDE 5 MG/1
10 TABLET ORAL EVERY 4 HOURS PRN
Status: DISCONTINUED | OUTPATIENT
Start: 2023-01-25 | End: 2023-01-26 | Stop reason: HOSPADM

## 2023-01-25 RX ORDER — LIDOCAINE HYDROCHLORIDE 10 MG/ML
INJECTION, SOLUTION EPIDURAL; INFILTRATION; INTRACAUDAL; PERINEURAL PRN
Status: DISCONTINUED | OUTPATIENT
Start: 2023-01-25 | End: 2023-01-25 | Stop reason: SDUPTHER

## 2023-01-25 RX ORDER — ONDANSETRON 2 MG/ML
4 INJECTION INTRAMUSCULAR; INTRAVENOUS EVERY 6 HOURS PRN
Status: DISCONTINUED | OUTPATIENT
Start: 2023-01-25 | End: 2023-01-26 | Stop reason: HOSPADM

## 2023-01-25 RX ORDER — ASPIRIN 81 MG/1
81 TABLET ORAL DAILY
Qty: 30 TABLET | Refills: 3
Start: 2023-01-25

## 2023-01-25 RX ORDER — POLYETHYLENE GLYCOL 3350 17 G/17G
17 POWDER, FOR SOLUTION ORAL DAILY
Status: DISCONTINUED | OUTPATIENT
Start: 2023-01-26 | End: 2023-01-26 | Stop reason: HOSPADM

## 2023-01-25 RX ORDER — TROSPIUM CHLORIDE 20 MG/1
20 TABLET, FILM COATED ORAL
Status: DISCONTINUED | OUTPATIENT
Start: 2023-01-25 | End: 2023-01-26

## 2023-01-25 RX ORDER — GLYCOPYRROLATE 0.2 MG/ML
INJECTION INTRAMUSCULAR; INTRAVENOUS PRN
Status: DISCONTINUED | OUTPATIENT
Start: 2023-01-25 | End: 2023-01-25 | Stop reason: SDUPTHER

## 2023-01-25 RX ORDER — HYDRALAZINE HYDROCHLORIDE 20 MG/ML
10 INJECTION INTRAMUSCULAR; INTRAVENOUS
Status: DISCONTINUED | OUTPATIENT
Start: 2023-01-25 | End: 2023-01-25 | Stop reason: HOSPADM

## 2023-01-25 RX ORDER — DEXTROSE MONOHYDRATE 100 MG/ML
INJECTION, SOLUTION INTRAVENOUS CONTINUOUS PRN
Status: DISCONTINUED | OUTPATIENT
Start: 2023-01-25 | End: 2023-01-26 | Stop reason: HOSPADM

## 2023-01-25 RX ORDER — MAGNESIUM HYDROXIDE 1200 MG/15ML
LIQUID ORAL PRN
Status: DISCONTINUED | OUTPATIENT
Start: 2023-01-25 | End: 2023-01-25 | Stop reason: HOSPADM

## 2023-01-25 RX ORDER — INSULIN LISPRO 100 [IU]/ML
0-4 INJECTION, SOLUTION INTRAVENOUS; SUBCUTANEOUS
Status: DISCONTINUED | OUTPATIENT
Start: 2023-01-25 | End: 2023-01-26 | Stop reason: HOSPADM

## 2023-01-25 RX ORDER — ESMOLOL HYDROCHLORIDE 10 MG/ML
INJECTION INTRAVENOUS PRN
Status: DISCONTINUED | OUTPATIENT
Start: 2023-01-25 | End: 2023-01-25 | Stop reason: SDUPTHER

## 2023-01-25 RX ORDER — HYDRALAZINE HYDROCHLORIDE 20 MG/ML
INJECTION INTRAMUSCULAR; INTRAVENOUS PRN
Status: DISCONTINUED | OUTPATIENT
Start: 2023-01-25 | End: 2023-01-25

## 2023-01-25 RX ORDER — SODIUM CHLORIDE 9 MG/ML
INJECTION, SOLUTION INTRAVENOUS PRN
Status: DISCONTINUED | OUTPATIENT
Start: 2023-01-25 | End: 2023-01-25 | Stop reason: HOSPADM

## 2023-01-25 RX ORDER — SODIUM CHLORIDE 0.9 % (FLUSH) 0.9 %
5-40 SYRINGE (ML) INJECTION EVERY 12 HOURS SCHEDULED
Status: DISCONTINUED | OUTPATIENT
Start: 2023-01-25 | End: 2023-01-26 | Stop reason: HOSPADM

## 2023-01-25 RX ORDER — HYDRALAZINE HYDROCHLORIDE 20 MG/ML
INJECTION INTRAMUSCULAR; INTRAVENOUS PRN
Status: DISCONTINUED | OUTPATIENT
Start: 2023-01-25 | End: 2023-01-25 | Stop reason: SDUPTHER

## 2023-01-25 RX ORDER — INSULIN LISPRO 100 [IU]/ML
0-4 INJECTION, SOLUTION INTRAVENOUS; SUBCUTANEOUS NIGHTLY
Status: DISCONTINUED | OUTPATIENT
Start: 2023-01-25 | End: 2023-01-26 | Stop reason: HOSPADM

## 2023-01-25 RX ORDER — DOCUSATE SODIUM 100 MG/1
100 CAPSULE, LIQUID FILLED ORAL 2 TIMES DAILY
Qty: 60 CAPSULE | Refills: 0 | Status: SHIPPED | OUTPATIENT
Start: 2023-01-25 | End: 2023-02-24

## 2023-01-25 RX ORDER — SODIUM CHLORIDE 9 MG/ML
INJECTION, SOLUTION INTRAVENOUS CONTINUOUS
Status: DISCONTINUED | OUTPATIENT
Start: 2023-01-25 | End: 2023-01-26 | Stop reason: HOSPADM

## 2023-01-25 RX ORDER — SODIUM CHLORIDE 0.9 % (FLUSH) 0.9 %
5-40 SYRINGE (ML) INJECTION PRN
Status: DISCONTINUED | OUTPATIENT
Start: 2023-01-25 | End: 2023-01-25 | Stop reason: HOSPADM

## 2023-01-25 RX ORDER — PROPOFOL 10 MG/ML
INJECTION, EMULSION INTRAVENOUS PRN
Status: DISCONTINUED | OUTPATIENT
Start: 2023-01-25 | End: 2023-01-25 | Stop reason: SDUPTHER

## 2023-01-25 RX ORDER — LABETALOL HYDROCHLORIDE 5 MG/ML
10 INJECTION, SOLUTION INTRAVENOUS
Status: DISCONTINUED | OUTPATIENT
Start: 2023-01-25 | End: 2023-01-25 | Stop reason: HOSPADM

## 2023-01-25 RX ORDER — HYDROCODONE BITARTRATE AND ACETAMINOPHEN 5; 325 MG/1; MG/1
1 TABLET ORAL EVERY 4 HOURS PRN
Qty: 15 TABLET | Refills: 0 | Status: SHIPPED | OUTPATIENT
Start: 2023-01-25 | End: 2023-01-28

## 2023-01-25 RX ORDER — ONDANSETRON 4 MG/1
4 TABLET, ORALLY DISINTEGRATING ORAL EVERY 8 HOURS PRN
Status: DISCONTINUED | OUTPATIENT
Start: 2023-01-25 | End: 2023-01-26 | Stop reason: HOSPADM

## 2023-01-25 RX ORDER — HEPARIN SODIUM 5000 [USP'U]/ML
5000 INJECTION, SOLUTION INTRAVENOUS; SUBCUTANEOUS ONCE
Status: COMPLETED | OUTPATIENT
Start: 2023-01-25 | End: 2023-01-25

## 2023-01-25 RX ORDER — NEOSTIGMINE METHYLSULFATE 5 MG/5 ML
SYRINGE (ML) INTRAVENOUS PRN
Status: DISCONTINUED | OUTPATIENT
Start: 2023-01-25 | End: 2023-01-25 | Stop reason: SDUPTHER

## 2023-01-25 RX ORDER — ONDANSETRON 2 MG/ML
4 INJECTION INTRAMUSCULAR; INTRAVENOUS
Status: COMPLETED | OUTPATIENT
Start: 2023-01-25 | End: 2023-01-25

## 2023-01-25 RX ORDER — ONDANSETRON 2 MG/ML
INJECTION INTRAMUSCULAR; INTRAVENOUS PRN
Status: DISCONTINUED | OUTPATIENT
Start: 2023-01-25 | End: 2023-01-25 | Stop reason: SDUPTHER

## 2023-01-25 RX ORDER — OXYCODONE HYDROCHLORIDE 5 MG/1
5 TABLET ORAL EVERY 4 HOURS PRN
Status: DISCONTINUED | OUTPATIENT
Start: 2023-01-25 | End: 2023-01-26 | Stop reason: HOSPADM

## 2023-01-25 RX ORDER — BUPIVACAINE HYDROCHLORIDE AND EPINEPHRINE 5; 5 MG/ML; UG/ML
INJECTION, SOLUTION PERINEURAL PRN
Status: DISCONTINUED | OUTPATIENT
Start: 2023-01-25 | End: 2023-01-25 | Stop reason: HOSPADM

## 2023-01-25 RX ORDER — FENTANYL CITRATE 50 UG/ML
INJECTION, SOLUTION INTRAMUSCULAR; INTRAVENOUS PRN
Status: DISCONTINUED | OUTPATIENT
Start: 2023-01-25 | End: 2023-01-25 | Stop reason: SDUPTHER

## 2023-01-25 RX ORDER — SODIUM CHLORIDE, SODIUM LACTATE, POTASSIUM CHLORIDE, CALCIUM CHLORIDE 600; 310; 30; 20 MG/100ML; MG/100ML; MG/100ML; MG/100ML
1000 INJECTION, SOLUTION INTRAVENOUS CONTINUOUS
Status: DISCONTINUED | OUTPATIENT
Start: 2023-01-25 | End: 2023-01-25 | Stop reason: HOSPADM

## 2023-01-25 RX ORDER — DEXAMETHASONE SODIUM PHOSPHATE 10 MG/ML
INJECTION, SOLUTION INTRAMUSCULAR; INTRAVENOUS PRN
Status: DISCONTINUED | OUTPATIENT
Start: 2023-01-25 | End: 2023-01-25 | Stop reason: SDUPTHER

## 2023-01-25 RX ORDER — ROCURONIUM BROMIDE 10 MG/ML
INJECTION, SOLUTION INTRAVENOUS PRN
Status: DISCONTINUED | OUTPATIENT
Start: 2023-01-25 | End: 2023-01-25 | Stop reason: SDUPTHER

## 2023-01-25 RX ORDER — ACETAMINOPHEN 325 MG/1
650 TABLET ORAL EVERY 6 HOURS
Status: DISCONTINUED | OUTPATIENT
Start: 2023-01-25 | End: 2023-01-26 | Stop reason: HOSPADM

## 2023-01-25 RX ORDER — SODIUM CHLORIDE 0.9 % (FLUSH) 0.9 %
5-40 SYRINGE (ML) INJECTION EVERY 12 HOURS SCHEDULED
Status: DISCONTINUED | OUTPATIENT
Start: 2023-01-25 | End: 2023-01-25 | Stop reason: HOSPADM

## 2023-01-25 RX ORDER — SODIUM CHLORIDE 9 MG/ML
INJECTION, SOLUTION INTRAVENOUS PRN
Status: DISCONTINUED | OUTPATIENT
Start: 2023-01-25 | End: 2023-01-26 | Stop reason: HOSPADM

## 2023-01-25 RX ORDER — MEPERIDINE HYDROCHLORIDE 50 MG/ML
12.5 INJECTION INTRAMUSCULAR; INTRAVENOUS; SUBCUTANEOUS ONCE
Status: DISCONTINUED | OUTPATIENT
Start: 2023-01-25 | End: 2023-01-25

## 2023-01-25 RX ADMIN — ESMOLOL HYDROCHLORIDE 10 MG: 10 INJECTION, SOLUTION INTRAVENOUS at 11:10

## 2023-01-25 RX ADMIN — DEXAMETHASONE SODIUM PHOSPHATE 10 MG: 10 INJECTION INTRAMUSCULAR; INTRAVENOUS at 10:37

## 2023-01-25 RX ADMIN — PROPOFOL 50 MG: 10 INJECTION, EMULSION INTRAVENOUS at 11:05

## 2023-01-25 RX ADMIN — OXYCODONE HYDROCHLORIDE 10 MG: 5 TABLET ORAL at 22:39

## 2023-01-25 RX ADMIN — ONDANSETRON 4 MG: 2 INJECTION INTRAMUSCULAR; INTRAVENOUS at 20:11

## 2023-01-25 RX ADMIN — ROCURONIUM BROMIDE 10 MG: 10 INJECTION, SOLUTION INTRAVENOUS at 11:30

## 2023-01-25 RX ADMIN — FENTANYL CITRATE 50 MCG: 50 INJECTION, SOLUTION INTRAMUSCULAR; INTRAVENOUS at 10:49

## 2023-01-25 RX ADMIN — Medication 1 MG: at 12:24

## 2023-01-25 RX ADMIN — SODIUM CHLORIDE, PRESERVATIVE FREE 10 ML: 5 INJECTION INTRAVENOUS at 20:12

## 2023-01-25 RX ADMIN — FENTANYL CITRATE 50 MCG: 50 INJECTION, SOLUTION INTRAMUSCULAR; INTRAVENOUS at 12:23

## 2023-01-25 RX ADMIN — GLYCOPYRROLATE 0.6 MG: 0.2 INJECTION INTRAMUSCULAR; INTRAVENOUS at 12:05

## 2023-01-25 RX ADMIN — SODIUM CHLORIDE: 9 INJECTION, SOLUTION INTRAVENOUS at 14:48

## 2023-01-25 RX ADMIN — FENTANYL CITRATE 100 MCG: 50 INJECTION, SOLUTION INTRAMUSCULAR; INTRAVENOUS at 10:32

## 2023-01-25 RX ADMIN — ACETAMINOPHEN 650 MG: 325 TABLET ORAL at 14:44

## 2023-01-25 RX ADMIN — ROCURONIUM BROMIDE 10 MG: 10 INJECTION, SOLUTION INTRAVENOUS at 11:04

## 2023-01-25 RX ADMIN — HYDRALAZINE HYDROCHLORIDE 5 MG: 20 INJECTION, SOLUTION INTRAMUSCULAR; INTRAVENOUS at 11:55

## 2023-01-25 RX ADMIN — ACETAMINOPHEN 650 MG: 325 TABLET ORAL at 20:11

## 2023-01-25 RX ADMIN — Medication 2000 MG: at 10:37

## 2023-01-25 RX ADMIN — PROPOFOL 50 MG: 10 INJECTION, EMULSION INTRAVENOUS at 10:33

## 2023-01-25 RX ADMIN — Medication 2000 MG: at 18:32

## 2023-01-25 RX ADMIN — TROSPIUM CHLORIDE 20 MG: 20 TABLET, FILM COATED ORAL at 18:32

## 2023-01-25 RX ADMIN — Medication 3 MG: at 12:05

## 2023-01-25 RX ADMIN — FENTANYL CITRATE 50 MCG: 50 INJECTION, SOLUTION INTRAMUSCULAR; INTRAVENOUS at 12:32

## 2023-01-25 RX ADMIN — HYDRALAZINE HYDROCHLORIDE 10 MG: 20 INJECTION, SOLUTION INTRAMUSCULAR; INTRAVENOUS at 12:05

## 2023-01-25 RX ADMIN — LIDOCAINE HYDROCHLORIDE 50 MG: 10 INJECTION, SOLUTION EPIDURAL; INFILTRATION; INTRACAUDAL; PERINEURAL at 10:32

## 2023-01-25 RX ADMIN — ONDANSETRON 4 MG: 2 INJECTION INTRAMUSCULAR; INTRAVENOUS at 12:05

## 2023-01-25 RX ADMIN — HEPARIN SODIUM 5000 UNITS: 5000 INJECTION INTRAVENOUS; SUBCUTANEOUS at 09:23

## 2023-01-25 RX ADMIN — OXYCODONE HYDROCHLORIDE 10 MG: 5 TABLET ORAL at 16:51

## 2023-01-25 RX ADMIN — ROCURONIUM BROMIDE 50 MG: 10 INJECTION, SOLUTION INTRAVENOUS at 10:32

## 2023-01-25 RX ADMIN — HYDROMORPHONE HYDROCHLORIDE 0.5 MG: 1 INJECTION, SOLUTION INTRAMUSCULAR; INTRAVENOUS; SUBCUTANEOUS at 13:20

## 2023-01-25 RX ADMIN — ESMOLOL HYDROCHLORIDE 10 MG: 10 INJECTION, SOLUTION INTRAVENOUS at 11:32

## 2023-01-25 RX ADMIN — SODIUM CHLORIDE, POTASSIUM CHLORIDE, SODIUM LACTATE AND CALCIUM CHLORIDE 1000 ML: 600; 310; 30; 20 INJECTION, SOLUTION INTRAVENOUS at 09:24

## 2023-01-25 RX ADMIN — HYDROMORPHONE HYDROCHLORIDE 0.25 MG: 1 INJECTION, SOLUTION INTRAMUSCULAR; INTRAVENOUS; SUBCUTANEOUS at 13:40

## 2023-01-25 RX ADMIN — ONDANSETRON 4 MG: 2 INJECTION INTRAMUSCULAR; INTRAVENOUS at 13:15

## 2023-01-25 RX ADMIN — MEPERIDINE HYDROCHLORIDE 12.5 MG: 50 INJECTION, SOLUTION INTRAMUSCULAR; INTRAVENOUS; SUBCUTANEOUS at 13:08

## 2023-01-25 RX ADMIN — SUGAMMADEX 100 MG: 100 INJECTION, SOLUTION INTRAVENOUS at 12:26

## 2023-01-25 RX ADMIN — HYDRALAZINE HYDROCHLORIDE 5 MG: 20 INJECTION, SOLUTION INTRAMUSCULAR; INTRAVENOUS at 12:00

## 2023-01-25 RX ADMIN — MORPHINE SULFATE 2 MG: 2 INJECTION, SOLUTION INTRAMUSCULAR; INTRAVENOUS at 14:45

## 2023-01-25 RX ADMIN — MORPHINE SULFATE 2 MG: 2 INJECTION, SOLUTION INTRAMUSCULAR; INTRAVENOUS at 19:12

## 2023-01-25 RX ADMIN — MORPHINE SULFATE 2 MG: 2 INJECTION, SOLUTION INTRAMUSCULAR; INTRAVENOUS at 23:44

## 2023-01-25 RX ADMIN — HYDRALAZINE HYDROCHLORIDE 10 MG: 20 INJECTION, SOLUTION INTRAMUSCULAR; INTRAVENOUS at 12:18

## 2023-01-25 RX ADMIN — PROPOFOL 200 MG: 10 INJECTION, EMULSION INTRAVENOUS at 10:32

## 2023-01-25 RX ADMIN — FENTANYL CITRATE 50 MCG: 50 INJECTION, SOLUTION INTRAMUSCULAR; INTRAVENOUS at 11:08

## 2023-01-25 ASSESSMENT — PAIN DESCRIPTION - PAIN TYPE
TYPE: SURGICAL PAIN

## 2023-01-25 ASSESSMENT — PAIN DESCRIPTION - DESCRIPTORS
DESCRIPTORS: SHARP;STABBING
DESCRIPTORS: SHARP;STABBING
DESCRIPTORS: ACHING
DESCRIPTORS: SHARP;STABBING
DESCRIPTORS: SHARP

## 2023-01-25 ASSESSMENT — PAIN SCALES - GENERAL
PAINLEVEL_OUTOF10: 6
PAINLEVEL_OUTOF10: 7
PAINLEVEL_OUTOF10: 6
PAINLEVEL_OUTOF10: 8
PAINLEVEL_OUTOF10: 7
PAINLEVEL_OUTOF10: 1
PAINLEVEL_OUTOF10: 8
PAINLEVEL_OUTOF10: 10
PAINLEVEL_OUTOF10: 7
PAINLEVEL_OUTOF10: 4
PAINLEVEL_OUTOF10: 7
PAINLEVEL_OUTOF10: 6
PAINLEVEL_OUTOF10: 7
PAINLEVEL_OUTOF10: 4

## 2023-01-25 ASSESSMENT — PAIN DESCRIPTION - FREQUENCY: FREQUENCY: CONTINUOUS

## 2023-01-25 ASSESSMENT — PAIN DESCRIPTION - LOCATION
LOCATION: ABDOMEN
LOCATION: GROIN;ABDOMEN;FLANK
LOCATION: ABDOMEN

## 2023-01-25 ASSESSMENT — PAIN DESCRIPTION - ORIENTATION
ORIENTATION: LEFT
ORIENTATION: LEFT;LOWER
ORIENTATION: LEFT

## 2023-01-25 ASSESSMENT — LIFESTYLE VARIABLES: SMOKING_STATUS: 1

## 2023-01-25 ASSESSMENT — PAIN DESCRIPTION - ONSET: ONSET: ON-GOING

## 2023-01-25 NOTE — ANESTHESIA POSTPROCEDURE EVALUATION
Department of Anesthesiology  Postprocedure Note    Patient: Yu Cleaning  MRN: [id number]  [place]: 1965  Date of evaluation: 1/25/2023      Procedure Summary     Date: 01/25/23 Room / Location: [address]    Anesthesia Start: 1022 Anesthesia Stop: 9177    Procedure: XI ROBOTIC LAPAROSCOPIC RADICAL NEPHRECTOMY (Left) Diagnosis:       Renal mass      (RENAL MASS)    Surgeons: Aster Moy MD Responsible Provider: Amarjit Knutson MD    Anesthesia Type: general ASA Status: 3          Anesthesia Type: No value filed.     Brianne Phase I: Brianne Score: 9    Brianne Phase II:        Anesthesia Post Evaluation    Patient location during evaluation: PACU  Patient participation: complete - patient participated  Level of consciousness: awake  Airway patency: patent  Nausea & Vomiting: no nausea and no vomiting  Complications: no  Cardiovascular status: blood pressure returned to baseline  Respiratory status: acceptable  Hydration status: euvolemic  Comments: /70   Pulse 82   Temp 98 °F (36.7 °C) (Oral)   Resp 16   Ht 5' 10\" (1.778 m)   Wt 136 lb 3.9 oz (61.8 kg)   SpO2 99%   BMI 19.55 kg/m²

## 2023-01-25 NOTE — OP NOTE
Operative Note      Patient: Yudy Adam  YOB: 1965  MRN: [id number]    Date of Procedure: 1/25/2023    Pre-Op Diagnosis: RENAL MASS    Post-Op Diagnosis:  LEFT RENAL MASS        Procedure(s):  XI ROBOTIC LAPAROSCOPIC LEFT RADICAL NEPHRECTOMY    Surgeon(s):  Carla Arreaga MD    Assistant:   First Assistant: Saira Arellano  Resident: Kaia Sanchez MD    Anesthesia: General    Estimated Blood Loss (mL): less than 50     Complications: None    Specimens:   ID Type Source Tests Collected by Time Destination   1 : swain start urine culture Urine Bladder CULTURE, URINE Carla Arreaga MD 1/25/2023 1152    A : left kidney with mass  Tissue Kidney SURGICAL PATHOLOGY Carla Arreaga MD 1/25/2023 1151        Implants:  * No implants in log *      Drains:   Urinary Catheter 01/25/23 Swain (Active)   Catheter Indications Perioperative use for selected surgical procedures 01/25/23 1355   Urine Color Yellow 01/25/23 1355   Urine Appearance Clear 01/25/23 1355   Collection Container Standard 01/25/23 1355   Securement Method Securing device (Describe) 01/25/23 1355   Catheter Best Practices  Drainage tube clipped to bed;Catheter secured to thigh; Bag below bladder;Bag not on floor; Lack of dependent loop in tubing;Drainage bag less than half full 01/25/23 1355   Status Draining 01/25/23 1355       Findings:   3 renal arteries and 2 renal veins at the hilum  Successful robotic assisted laparoscopic left radical nephrectomy with good hemostasis       DVT prophylaxis: EPC, heparin SC      Indications: Yudy Adam is a 51-year-old male who was diagnosed with a incidental left renal mass and CT angiogram during November 2022. The mass was centrally located, about 6 cm, mid to upper pole, posterior medial in location. All treatment options were discussed. Risks, benefits, goals, alternatives, possible complications were discussed with patient. Patient elected for above mentioned procedures. Consent was signed. Patient elected to proceed. Details: Patient was brought back to the operating room. Laid in the supine position. EPC cuffs were placed on and functioning prior to anesthesia. Antibiotics were given. Field catheter was placed. General anesthesia was inducted. Patient was then placed in Right lateral position with left side up. Pressure points were padded. Axillary roll was placed. Patient was then prepped, draped in usual sterile standard fashion. Veress needle was used to obtain pneumo-peritoneum successfully. We entered the intra-abdominal cavity under direct visualization. Robotic ports were placed under direct visualizations. The Robot was docked. The Left white line of Toldt was cut and the colon was reflected. This was carried down to [place], and the ureter and gonadal vessel were identified. The tail of gerota's was lifted off the psoas and the kidney was lifted. We approached the hilum and the arteries and veins were identified, there were 3 arteries and 2 veins. We then dissected off the superior aspect by incising the peritoneum superiorly. The endovascular stapler was used to ligate the hilum en cash without issues. Then the superior pole attachments were taken down in two staple loads. The lateral attachments of the kidney were dissected and we mobilized the kidney completely. The tail of gerota's and the ureter was also stapled. The specimen was placed in the endocatch bag. The surgical bed was inspected and it was hemostatic. We applied Artista to the field. The robot was undocked. The specimen was removed from the left lower abdominal port site. The fascia was closed with running 0- PDS suture from each apex and tied in the middle. The robotic ports were removed under direct visualization without evidence of bleeding. The skin was closed with 4-0 monocryl and reinforced with dermabond. Case was concluded and patient tolerated the procedure well.  Anesthesia was reversed and patient was extubated, taken to recovery in stable conditon. Patient was admitted to the floor for routine post op care. Dr. Roman Pozo was present for all key portions of the case.      Electronically signed by Vi Roa MD on 1/25/2023 at 2:37 PM

## 2023-01-25 NOTE — DISCHARGE INSTRUCTIONS
General Discharge Instructions: Nephrectomy     HOLD ASPIRIN FOR 5 DAYS AFTER SURGERY    Patient ok to discharge home in good condition  No heavy lifting, >10 lbs for 4-6 week, or until cleared by attending physician  Patient should avoid strenuous activity for 4-6 weeks  Patient should walk moderately at home 8-10x per day  Patient may resume diet as tolerated: Wean off of narcotic pain medication to plain tylenol, avoid NSAIDs. Patient should take prescriptions as directed  No driving while on narcotics  Patient ok to shower in 24 hrs after discharge while keeping incision clean/dry  No submerging incisions for 2 weeks    Please call attending physician or hospital  with questions  Call or Present to ED if fever (> 101F), intractable nausea/vomiting or pain, if incisions become red/swollen or drain pus/fluid, or if calves become red swollen and tender. Patient should follow up with Dr. Melinda Blake, in 1-2 weeks. Call office to confirm appointment.

## 2023-01-25 NOTE — INTERVAL H&P NOTE
Update History & Physical    The patient's History and Physical of January 11, 2023 was reviewed with the patient and I examined the patient. There was no change. The surgical site was confirmed by the patient and me. Plan: The risks, benefits, expected outcome, and alternative to the recommended procedure have been discussed with the patient. Patient understands and wants to proceed with the procedure.      Electronically signed by Maycol Booker PA-C on 1/25/2023 at 8:59 AM

## 2023-01-25 NOTE — ANESTHESIA PRE PROCEDURE
Department of Anesthesiology  Preprocedure Note       Name:  Vikki Lamar   Age:  62 y.o.  :  1965                                          MRN:  [id number]         Date:  2023      Surgeon: Antonio Munguia):  Jerardo Lyles MD    Procedure: Procedure(s):  XI ROBOTIC LAPAROSCOPIC RADICAL NEPHRECTOMY    Medications prior to admission:   Prior to Admission medications    Medication Sig Start Date End Date Taking? Authorizing Provider   aspirin 81 MG EC tablet Take 1 tablet by mouth daily 22   Concha Johnson DO       Current medications:    No current facility-administered medications for this visit. No current outpatient medications on file. Facility-Administered Medications Ordered in Other Visits   Medication Dose Route Frequency Provider Last Rate Last Admin    ceFAZolin (ANCEF) 2000 mg in sterile water 20 mL IV syringe  2,000 mg IntraVENous Once Vamsi Enrique MD        lactated ringers IV soln infusion 1,000 mL  1,000 mL IntraVENous Continuous Feliciano Heath MD 50 mL/hr at 23 0924 1,000 mL at 23 09       Allergies:     Allergies   Allergen Reactions    Adhesive Tape Other (See Comments)     \"Paper tape\" red, raw and scarring        Problem List:    Patient Active Problem List   Diagnosis Code    Occlusion of aorta ([address] I70.0       Past Medical History:        Diagnosis Date    Arthritis     Atherosclerosis of artery of both lower extremities ([address]     Awareness under anesthesia 2012    states toward the end of his pain pump removal in [facility]     Chronic back pain     COVID-19 vaccination not done     History of pleurisy     Hx of blood clots     bladder post abd aortic aneurysm repair    Left anterior fascicular block (LAFB)     Left renal mass     Pain management     [facility]    Poor dentition     RBBB     S/P AAA repair 11/15/2022    Dr. Nitish Duque, has been granted vascular clearance, also has upcoming f/u appt on 23    Wellness examination Dr. Ranjit Long PCP Viky Natarajan last visit 2023       Past Surgical History:        Procedure Laterality Date    ABDOMINAL AORTIC ANEURYSM REPAIR N/A 11/15/2022    AORTO BIFEMORAL BYPASS, 16MM X 8MM BIFURCATED ALBOGRAFT GRAFT, BLOCK, CELLSAVER performed by Dennis Aguilar MD at [address]    x 2   [address] TEST  2022    [facility]- copy on physical pre-op folder, \"Negative\"    OTHER SURGICAL HISTORY  2012    pain pump removed       Social History:    Social History     Tobacco Use    Smoking status: Former     Packs/day: 0.50     Types: Cigarettes     Quit date: 11/15/2022     Years since quittin.1    Smokeless tobacco: Never   Substance Use Topics    Alcohol use: Not Currently     Comment: once a year                                Counseling given: Not Answered      Vital Signs (Current): There were no vitals filed for this visit.                                            BP Readings from Last 3 Encounters:   23 134/76   23 138/79   22 130/73       NPO Status:                                                                                 BMI:   Wt Readings from Last 3 Encounters:   23 136 lb 3.9 oz (61.8 kg)   23 136 lb (61.7 kg)   22 145 lb (65.8 kg)     There is no height or weight on file to calculate BMI.    CBC:   Lab Results   Component Value Date/Time    WBC 8.9 2023 04:18 PM    RBC 4.26 2023 04:18 PM    HGB 12.6 2023 04:18 PM    HCT 38.8 2023 04:18 PM    MCV 91.1 2023 04:18 PM    RDW 14.3 2023 04:18 PM     2023 04:18 PM       CMP:   Lab Results   Component Value Date/Time     2023 04:18 PM    K 3.9 2023 04:18 PM     2023 04:18 PM    CO2 27 2023 04:18 PM    BUN 15 2023 04:18 PM    CREATININE 0.77 2023 04:18 PM    LABGLOM >60 2023 04:18 PM    GLUCOSE 102 2023 04:18 PM    CALCIUM 8.2 2022 04:46 AM       POC Tests: No results for input(s): POCGLU, POCNA, POCK, POCCL, POCBUN, POCHEMO, POCHCT in the last 72 hours. Coags: No results found for: PROTIME, INR, APTT    HCG (If Applicable): No results found for: PREGTESTUR, PREGSERUM, HCG, HCGQUANT     ABGs: No results found for: PHART, PO2ART, NLL3GPE, OFR4OBH, BEART, S7MSXBCR     Type & Screen (If Applicable):  No results found for: LABABO, LABRH    Drug/Infectious Status (If Applicable):  No results found for: HIV, HEPCAB    COVID-19 Screening (If Applicable): No results found for: COVID19        Anesthesia Evaluation  Patient summary reviewed no history of anesthetic complications:   Airway: Mallampati: II  TM distance: >3 FB   Neck ROM: full  Mouth opening: > = 3 FB   Dental:    (+) poor dentition  Comment: Significantly poor dentition - rotten and decaying teeth. Pulmonary:normal exam  breath sounds clear to auscultation  (+) current smoker                           Cardiovascular:  Exercise tolerance: good (>4 METS),         ECG reviewed    Rate: abnormal  Echocardiogram reviewed  Stress test reviewed  Cleared by cardiology             PE comment: Bradycardic in 40's   Neuro/Psych:   Negative Neuro/Psych ROS              GI/Hepatic/Renal:            ROS comment: Renal tumor. .   Endo/Other:    (+) malignancy/cancer. Abdominal:             Vascular:   + PVD, aortic or cerebral, . ROS comment: AAA; recent aortobifem. Other Findings: /76   Pulse 50   Temp 96.8 °F (36 °C) (Temporal)   Resp 18   Ht 5' 10\" (1.778 m)   Wt 136 lb 3.9 oz (61.8 kg)   SpO2 100%   BMI 19.55 kg/m²               Anesthesia Plan      general     ASA 3         arterial line  MIPS: Postoperative opioids intended, Prophylactic antiemetics administered and Postoperative trial extubation. Anesthetic plan and risks discussed with patient. Use of blood products discussed with patient whom consented to blood products.    Plan discussed with CRNA.            ABDIAZIZ Beard MD   1/25/2023

## 2023-01-26 VITALS
WEIGHT: 136.24 LBS | OXYGEN SATURATION: 98 % | HEIGHT: 70 IN | SYSTOLIC BLOOD PRESSURE: 141 MMHG | TEMPERATURE: 99 F | BODY MASS INDEX: 19.51 KG/M2 | HEART RATE: 70 BPM | RESPIRATION RATE: 16 BRPM | DIASTOLIC BLOOD PRESSURE: 79 MMHG

## 2023-01-26 LAB
ANION GAP SERPL CALCULATED.3IONS-SCNC: 9 MMOL/L (ref 9–17)
BUN BLDV-MCNC: 12 MG/DL (ref 6–20)
CALCIUM SERPL-MCNC: 8.9 MG/DL (ref 8.6–10.4)
CHLORIDE BLD-SCNC: 104 MMOL/L (ref 98–107)
CO2: 23 MMOL/L (ref 20–31)
CREAT SERPL-MCNC: 1.15 MG/DL (ref 0.7–1.2)
CULTURE: NO GROWTH
GFR SERPL CREATININE-BSD FRML MDRD: >60 ML/MIN/1.73M2
GLUCOSE BLD-MCNC: 122 MG/DL (ref 75–110)
GLUCOSE BLD-MCNC: 122 MG/DL (ref 75–110)
GLUCOSE BLD-MCNC: 124 MG/DL (ref 75–110)
GLUCOSE BLD-MCNC: 87 MG/DL (ref 70–99)
HCT VFR BLD CALC: 36 % (ref 40.7–50.3)
HEMOGLOBIN: 11.3 G/DL (ref 13–17)
Lab: NORMAL
MCH RBC QN AUTO: 28.8 PG (ref 25.2–33.5)
MCHC RBC AUTO-ENTMCNC: 31.4 G/DL (ref 28.4–34.8)
MCV RBC AUTO: 91.6 FL (ref 82.6–102.9)
NRBC AUTOMATED: 0 PER 100 WBC
PDW BLD-RTO: 14.6 % (ref 11.8–14.4)
PLATELET # BLD: 167 K/UL (ref 138–453)
PMV BLD AUTO: 10.8 FL (ref 8.1–13.5)
POTASSIUM SERPL-SCNC: 4.2 MMOL/L (ref 3.7–5.3)
RBC # BLD: 3.93 M/UL (ref 4.21–5.77)
SODIUM BLD-SCNC: 136 MMOL/L (ref 135–144)
SPECIMEN DESCRIPTION: NORMAL
WBC # BLD: 10.3 K/UL (ref 3.5–11.3)

## 2023-01-26 PROCEDURE — 82947 ASSAY GLUCOSE BLOOD QUANT: CPT

## 2023-01-26 PROCEDURE — 6370000000 HC RX 637 (ALT 250 FOR IP): Performed by: STUDENT IN AN ORGANIZED HEALTH CARE EDUCATION/TRAINING PROGRAM

## 2023-01-26 PROCEDURE — 6360000002 HC RX W HCPCS: Performed by: STUDENT IN AN ORGANIZED HEALTH CARE EDUCATION/TRAINING PROGRAM

## 2023-01-26 PROCEDURE — 36415 COLL VENOUS BLD VENIPUNCTURE: CPT

## 2023-01-26 PROCEDURE — 80048 BASIC METABOLIC PNL TOTAL CA: CPT

## 2023-01-26 PROCEDURE — 2580000003 HC RX 258: Performed by: STUDENT IN AN ORGANIZED HEALTH CARE EDUCATION/TRAINING PROGRAM

## 2023-01-26 PROCEDURE — 85027 COMPLETE CBC AUTOMATED: CPT

## 2023-01-26 RX ADMIN — ACETAMINOPHEN 650 MG: 325 TABLET ORAL at 15:16

## 2023-01-26 RX ADMIN — POLYETHYLENE GLYCOL 3350 17 G: 17 POWDER, FOR SOLUTION ORAL at 09:02

## 2023-01-26 RX ADMIN — Medication 2000 MG: at 02:56

## 2023-01-26 RX ADMIN — TROSPIUM CHLORIDE 20 MG: 20 TABLET, FILM COATED ORAL at 06:23

## 2023-01-26 RX ADMIN — ACETAMINOPHEN 650 MG: 325 TABLET ORAL at 09:02

## 2023-01-26 RX ADMIN — TROSPIUM CHLORIDE 20 MG: 20 TABLET, FILM COATED ORAL at 15:17

## 2023-01-26 RX ADMIN — Medication 2000 MG: at 09:07

## 2023-01-26 RX ADMIN — OXYCODONE HYDROCHLORIDE 10 MG: 5 TABLET ORAL at 13:18

## 2023-01-26 RX ADMIN — ACETAMINOPHEN 650 MG: 325 TABLET ORAL at 02:55

## 2023-01-26 ASSESSMENT — PAIN DESCRIPTION - LOCATION
LOCATION: ABDOMEN
LOCATION: ABDOMEN

## 2023-01-26 ASSESSMENT — PAIN SCALES - GENERAL
PAINLEVEL_OUTOF10: 0
PAINLEVEL_OUTOF10: 7
PAINLEVEL_OUTOF10: 3
PAINLEVEL_OUTOF10: 7

## 2023-01-26 NOTE — PLAN OF CARE
Problem: Discharge Planning  Goal: Discharge to home or other facility with appropriate resources  1/26/2023 0549 by Randal Medina  Outcome: Progressing  1/25/2023 1823 by Cait Beard RN  Outcome: Progressing     Problem: Pain  Goal: Verbalizes/displays adequate comfort level or baseline comfort level  1/26/2023 0549 by Randal Medina  Outcome: Progressing  1/25/2023 1823 by Cait Beard RN  Outcome: Progressing     Problem: Safety - Adult  Goal: Free from fall injury  Outcome: Progressing     Problem: ABCDS Injury Assessment  Goal: Absence of physical injury  Outcome: Progressing

## 2023-01-26 NOTE — DISCHARGE SUMMARY
DISCHARGE SUMMARY NOTE:      Patient Identification  PATIENT: Ryann Dacosta is a 62 y.o. male. MRN: [id number]  :  1965  Admit Date:  2023  Discharge date:   23                                  Disposition: home  Discharged Condition:  good  Discharge Diagnoses:   Patient Active Problem List   Diagnosis    Occlusion of aorta (HCC)    Renal mass, left    Left renal mass       Consults: none    Surgery: XI ROBOTIC LAPAROSCOPIC RADICAL NEPHRECTOMY     Patient Instructions: Activity: No restrictions. , no lifting more than 10 lbs, no driving under the influence of pain medications, no driving until swain catheter is removed, and no tub baths or submerging of wounds under water  Diet: As tolerated  Patient told to follow up with Laura Medellin MD in 2 week(s). Discharge Medications:      Medication List        START taking these medications      docusate sodium 100 MG capsule  Commonly known as: COLACE  Take 1 capsule by mouth 2 times daily     HYDROcodone-acetaminophen 5-325 MG per tablet  Commonly known as: Norco  Take 1 tablet by mouth every 4 hours as needed for Pain for up to 3 days. Intended supply: 3 days. Take lowest dose possible to manage pain Max Daily Amount: 6 tablets            CHANGE how you take these medications      aspirin 81 MG EC tablet  Take 1 tablet by mouth daily Hold for 5 days after procedure  What changed: additional instructions               Where to Get Your Medications        These medications were sent to [facility] [id number] [place], [place] [address] [place] [address], [address] [place] [address]      Phone: [phone]   docusate sodium 100 MG capsule  HYDROcodone-acetaminophen 5-325 MG per tablet       Information about where to get these medications is not yet available    Ask your nurse or doctor about these medications  aspirin 81 MG EC tablet         Hospital course:   This patient underwent planned robotic left radical nephrectomy due to left renal mass. There were no intraoperative or immediate postoperative complications. Patient was brought to the PACU in good condition then brought up to the floor in good condition. There were no acute events on the night after surgery. On postop day 1, patient had Field catheter removed, he was tolerating a regular diet with minimal pain, ambulating in the halls. Upon removal of Field catheter, patient did void but was having high postvoid residuals, we did wait until late afternoon on postop day 1 for discharge to ensure adequate bladder emptying. He will follow-up with Dr. Yulia Gonzales in 2 weeks.     Elinor Ellis MD  Urology Resident, PGY-2

## 2023-01-26 NOTE — CARE COORDINATION
Case Management Assessment  Initial Evaluation    Date/Time of Evaluation: 1/26/2023 12:57 PM  Assessment Completed by: Emma Miller RN    If patient is discharged prior to next notation, then this note serves as note for discharge by case management. Patient Name: Yudy Adam                   YOB: 1965  Diagnosis: Renal mass [N28.89]  Renal mass, left [N28.89]  Left renal mass [N28.89]                   Date / Time: 1/25/2023  8:24 AM    Patient Admission Status: Inpatient   Readmission Risk (Low < 19, Mod (19-27), High > 27): Readmission Risk Score: 9.3    Current PCP: Flower Hayes  PCP verified by CM? (P) Yes (Dr. Flower Hayes)    Chart Reviewed: Yes      History Provided by: (P) Patient  Patient Orientation: (P) Alert and Oriented, Person, Place, Situation    Patient Cognition: (P) Alert    Hospitalization in the last 30 days (Readmission):  No    If yes, Readmission Assessment in CM Navigator will be completed.     Advance Directives:      Code Status: Full Code   Patient's Primary Decision Maker is: (P) Legal Next of Kin      Discharge Planning:    Patient lives with: (P) Spouse/Significant Other Type of Home: (P) House  Primary Care Giver: (P) Self  Patient Support Systems include: (P) Spouse/Significant Other, Family Members   Current Financial resources: (P) Other (Comment) (BCBS)  Current community resources:    Current services prior to admission: (P) None            Current DME:              Type of Home Care services:  (P) None    ADLS  Prior functional level: (P) Independent in ADLs/IADLs  Current functional level: (P) Independent in ADLs/IADLs    PT AM-PAC:   /24  OT AM-PAC:   /24    Family can provide assistance at DC: (P) Yes  Would you like Case Management to discuss the discharge plan with any other family members/significant others, and if so, who? (P) No  Plans to Return to Present Housing: (P) Yes  Other Identified Issues/Barriers to RETURNING to current housing: na  Potential Assistance needed at discharge: (P) N/A            Potential DME:    Patient expects to discharge to: (P) [facility] for transportation at discharge: (P) Family    Financial    Payor: BCBS / Plan: [place] Agent PPO / Product Type: *No Product type* /     Does insurance require precert for SNF: na    Potential assistance Purchasing Medications: (P) No  [id number] [place] [address] [place], [place] [id number]  Phone: [phone] Fax: [phone]      Notes:    Factors facilitating achievement of predicted outcomes: Family support, Motivated, Cooperative, and Pleasant    Barriers to discharge: Pain    Additional Case Management Notes: Spoke with patient at bedside, role explained. Plan to return home with spouse, has transportation, denies further needs. Address, telephone numbers, PCP, ER contacts and insurance reviewed. The Plan for Transition of Care is related to the following treatment goals of Renal mass [N28.89]  Renal mass, left [N28.89]  Left renal mass [W96.40]    IF APPLICABLE: The Patient and/or patient representative Deyvi Marques and his family were provided with a choice of provider and agrees with the discharge plan. Freedom of choice list with basic dialogue that supports the patient's individualized plan of care/goals and shares the quality data associated with the providers was provided to: (P) Patient   Patient Representative Name:       The Patient and/or Patient Representative Agree with the Discharge Plan?       Matthew Lira RN  Case Management Department  Ph: [phone]

## 2023-01-26 NOTE — PROGRESS NOTES
Sebastian Sarabia Cleaster Oiler, Yamilet Desir  Urology Progress Note     Subjective: Status post robotic left radical nephrectomy, 1/26/23   No acute events overnight, afebrile, vital signs stable  Pain level: Minimal  Denies fever, chills, nausea, vomiting, chest pain, shortness of breath  Field catheter in place draining clear yellow urine    UOP: 1.7L since surgery  Labs pending    Patient Vitals for the past 24 hrs:   BP Temp Temp src Pulse Resp SpO2 Height Weight   01/26/23 0423 129/70 98 °F (36.7 °C) Oral 77 16 98 % -- --   01/26/23 0021 133/69 98.1 °F (36.7 °C) Oral 89 16 98 % -- --   01/26/23 0014 -- -- -- -- 18 -- -- --   01/25/23 2309 -- -- -- -- 18 -- -- --   01/25/23 2000 134/72 97.7 °F (36.5 °C) Oral 80 18 99 % -- --   01/25/23 1942 -- -- -- -- 16 -- -- --   01/25/23 1709 (!) 143/74 98.3 °F (36.8 °C) Oral 89 16 99 % -- --   01/25/23 1430 139/70 98 °F (36.7 °C) Oral 82 16 99 % -- --   01/25/23 1400 (!) 140/64 97.7 °F (36.5 °C) Temporal 75 14 -- -- --   01/25/23 1345 (!) 141/62 -- -- 72 14 95 % -- --   01/25/23 1330 (!) 122/59 -- -- 83 15 92 % -- --   01/25/23 1315 (!) 136/59 -- -- 81 12 93 % -- --   01/25/23 1300 138/62 -- -- 75 12 92 % -- --   01/25/23 1245 (!) 158/129 -- -- 83 14 92 % -- --   01/25/23 1235 -- -- -- 92 -- -- -- --   01/25/23 1230 (!) 151/73 96.8 °F (36 °C) Temporal 81 12 97 % -- --   01/25/23 0859 134/76 96.8 °F (36 °C) Temporal 50 18 100 % 5' 10\" (1.778 m) 136 lb 3.9 oz (61.8 kg)       Intake/Output Summary (Last 24 hours) at 1/26/2023 0747  Last data filed at 1/26/2023 0517  Gross per 24 hour   Intake 1200 ml   Output 1750 ml   Net -550 ml       Recent Labs     01/25/23  1245   WBC 18.6*   HGB 12.7*   HCT 39.6*   MCV 91.2        Recent Labs     01/25/23  1245      K 4.2      CO2 21   BUN 11   CREATININE 0.77       No results for input(s): COLORU, PHUR, LABCAST, WBCUA, RBCUA, MUCUS, TRICHOMONAS, YEAST, BACTERIA, CLARITYU, SPECGRAV, LEUKOCYTESUR, UROBILINOGEN, Michael Holley in the last 72 hours. Invalid input(s): NITRATE, GLUCOSEUKETONESUAMORPHOUS    Additional Lab/culture results:    Physical Exam:  Constitutional: Patient in no acute distress; Neuro: alert and oriented to person place and time.     Psych: Mood and affect normal.  Skin: Normal  Lungs: Respiratory effort normal  Cardiovascular:  Normal peripheral pulses  Abdomen: Soft, appropriately tender, non-distended, no peritoneal signs, incisions clean dry and intact  : Denies flank pain, no CVA tenderness, Field catheter in place draining clear yellow urine    Interval Imaging Findings:  None     problem list:  Left renal mass, status post robotic left radical nephrectomy    Plan:  -Follow-up on morning labs, if creatinine is normal, will remove Field catheter  -Voiding trial after Field catheter removal, obtain 3 consecutive postvoid residuals with amount of urine voided  -Pain control and antiemetics as needed  -Patient is ambulating well, continue  -Incentive spirometry 10 times per hour  -DVT prophylaxis: SCD cuffs  -Disposition: Plan for discharge today after patient passes voiding trial    Radha Hong MD  Urology Resident, PGY-2

## 2023-01-27 ENCOUNTER — OFFICE VISIT (OUTPATIENT)
Dept: VASCULAR SURGERY | Age: 58
End: 2023-01-27

## 2023-01-27 VITALS
SYSTOLIC BLOOD PRESSURE: 161 MMHG | WEIGHT: 139 LBS | BODY MASS INDEX: 19.9 KG/M2 | DIASTOLIC BLOOD PRESSURE: 83 MMHG | HEART RATE: 87 BPM | OXYGEN SATURATION: 96 % | HEIGHT: 70 IN | RESPIRATION RATE: 17 BRPM

## 2023-01-27 DIAGNOSIS — I70.213 ATHEROSCLER OF NATIVE ARTERY OF BOTH LEGS WITH INTERMIT CLAUDICATION (HCC): Primary | ICD-10-CM

## 2023-01-27 LAB — SURGICAL PATHOLOGY REPORT: NORMAL

## 2023-01-27 PROCEDURE — 99024 POSTOP FOLLOW-UP VISIT: CPT | Performed by: SURGERY

## 2023-01-27 NOTE — PROGRESS NOTES
[facility]  [address]  [address] [place] [address]  Dept: [phone]     Patient: Kvng Mina  : 1965  MRN: [id number]  DOS: 2023            HPI:  Kvng Mina is a 62 y.o. male who returns to the office regarding his aortobifemoral bypass. He is walking well without claudication. He had his nephrectomy on the left side via a robotic repair. He is doing well following that as well. He still complains of some shooting and stabbing pains in his midline abdominal incision. Review of Systems    Vitals:    23 1415   BP: (!) 161/83   Site: Right Upper Arm   Position: Sitting   Cuff Size: Medium Adult   Pulse: 87   Resp: 17   SpO2: 96%   Weight: 139 lb (63 kg)   Height: 5' 10\" (1.778 m)          Physical Exam on examination he has palpable distal pulses bilaterally. His feet are warm and well-perfused. All of his incisions of healed well. His groins have healed well and have no sign of infection. Assessment:  1. Atheroscler of native artery of both legs with intermit claudication ([address]          Plan: At this point we will continue to see him at yearly intervals with JULIO and PVR. He understands and agrees with these plans. If he has any problems in the meantime I am certainly happy to see him sooner rather than later.     Electronically signed by:  Nehal Bee MD

## 2023-02-17 PROBLEM — C64.2 MALIGNANT NEOPLASM OF LEFT KIDNEY, EXCEPT RENAL PELVIS (HCC): Status: ACTIVE | Noted: 2023-01-25

## 2023-06-05 ENCOUNTER — HOSPITAL ENCOUNTER (OUTPATIENT)
Age: 58
Discharge: HOME | End: 2023-06-05
Payer: COMMERCIAL

## 2023-06-05 ENCOUNTER — HOSPITAL ENCOUNTER (OUTPATIENT)
Dept: CT IMAGING | Age: 58
Discharge: HOME | End: 2023-06-07
Payer: COMMERCIAL

## 2023-06-05 DIAGNOSIS — Z98.890 OTHER SPECIFIED POSTPROCEDURAL STATES: ICD-10-CM

## 2023-06-05 LAB
CREAT SERPL-MCNC: 1.29 MG/DL (ref 0.7–1.2)
GFR SERPL CREATININE-BSD FRML MDRD: >60 ML/MIN/1.73M2

## 2023-06-05 PROCEDURE — 82565 ASSAY OF CREATININE: CPT

## 2023-06-05 PROCEDURE — 36415 COLL VENOUS BLD VENIPUNCTURE: CPT

## 2023-06-05 PROCEDURE — 6360000004 HC RX CONTRAST MEDICATION: Performed by: UROLOGY

## 2023-06-05 PROCEDURE — 74177 CT ABD & PELVIS W/CONTRAST: CPT

## 2023-06-05 RX ADMIN — IOPAMIDOL 75 ML: 755 INJECTION, SOLUTION INTRAVENOUS at 16:18

## 2023-09-07 PROBLEM — R19.03 RIGHT LOWER QUADRANT ABDOMINAL MASS: Status: ACTIVE | Noted: 2023-09-07

## 2023-09-20 PROBLEM — R19.00 ABDOMINAL MASS: Status: ACTIVE | Noted: 2023-09-20

## 2023-09-27 PROBLEM — R22.2 SUBCUTANEOUS MASS OF ABDOMINAL WALL: Status: ACTIVE | Noted: 2023-09-27

## 2024-01-01 ENCOUNTER — HOSPITAL ENCOUNTER (EMERGENCY)
Age: 59
Discharge: HOME | End: 2024-01-01
Attending: EMERGENCY MEDICINE
Payer: COMMERCIAL

## 2024-01-01 ENCOUNTER — APPOINTMENT (OUTPATIENT)
Dept: CT IMAGING | Age: 59
End: 2024-01-01
Payer: COMMERCIAL

## 2024-01-01 VITALS
BODY MASS INDEX: 21.47 KG/M2 | HEART RATE: 64 BPM | RESPIRATION RATE: 16 BRPM | TEMPERATURE: 98 F | HEIGHT: 70 IN | SYSTOLIC BLOOD PRESSURE: 116 MMHG | WEIGHT: 150 LBS | DIASTOLIC BLOOD PRESSURE: 66 MMHG | OXYGEN SATURATION: 100 %

## 2024-01-01 DIAGNOSIS — R31.9 HEMATURIA, UNSPECIFIED TYPE: Primary | ICD-10-CM

## 2024-01-01 LAB
ANION GAP SERPL CALCULATED.3IONS-SCNC: 9 MMOL/L (ref 9–17)
BACTERIA URNS QL MICRO: ABNORMAL
BASOPHILS # BLD: 0.04 K/UL (ref 0–0.2)
BASOPHILS NFR BLD: 0 % (ref 0–2)
BILIRUB UR QL STRIP: ABNORMAL
BUN SERPL-MCNC: 24 MG/DL (ref 6–20)
BUN/CREAT SERPL: 18 (ref 9–20)
CALCIUM SERPL-MCNC: 8.9 MG/DL (ref 8.6–10.4)
CHLORIDE SERPL-SCNC: 104 MMOL/L (ref 98–107)
CLARITY UR: ABNORMAL
CO2 SERPL-SCNC: 26 MMOL/L (ref 20–31)
COLOR UR: YELLOW
CREAT SERPL-MCNC: 1.3 MG/DL (ref 0.7–1.2)
EOSINOPHIL # BLD: 0.27 K/UL (ref 0–0.44)
EOSINOPHILS RELATIVE PERCENT: 3 % (ref 1–4)
EPI CELLS #/AREA URNS HPF: ABNORMAL /HPF (ref 0–5)
ERYTHROCYTE [DISTWIDTH] IN BLOOD BY AUTOMATED COUNT: 14.6 % (ref 11.8–14.4)
GFR SERPL CREATININE-BSD FRML MDRD: >60 ML/MIN/1.73M2
GLUCOSE SERPL-MCNC: 94 MG/DL (ref 70–99)
GLUCOSE UR STRIP-MCNC: NEGATIVE MG/DL
HCT VFR BLD AUTO: 39.6 % (ref 40.7–50.3)
HGB BLD-MCNC: 12.7 G/DL (ref 13–17)
HGB UR QL STRIP.AUTO: ABNORMAL
IMM GRANULOCYTES # BLD AUTO: 0.04 K/UL (ref 0–0.3)
IMM GRANULOCYTES NFR BLD: 0 %
KETONES UR STRIP-MCNC: NEGATIVE MG/DL
LEUKOCYTE ESTERASE UR QL STRIP: NEGATIVE
LYMPHOCYTES NFR BLD: 2.58 K/UL (ref 1.1–3.7)
LYMPHOCYTES RELATIVE PERCENT: 29 % (ref 24–43)
MCH RBC QN AUTO: 29.9 PG (ref 25.2–33.5)
MCHC RBC AUTO-ENTMCNC: 32.1 G/DL (ref 28.4–34.8)
MCV RBC AUTO: 93.2 FL (ref 82.6–102.9)
MONOCYTES NFR BLD: 0.7 K/UL (ref 0.1–1.2)
MONOCYTES NFR BLD: 8 % (ref 3–12)
NEUTROPHILS NFR BLD: 60 % (ref 36–65)
NEUTS SEG NFR BLD: 5.36 K/UL (ref 1.5–8.1)
NITRITE UR QL STRIP: NEGATIVE
NRBC BLD-RTO: 0 PER 100 WBC
PH UR STRIP: 5.5 [PH] (ref 5–9)
PLATELET # BLD AUTO: 156 K/UL (ref 138–453)
PMV BLD AUTO: 10.7 FL (ref 8.1–13.5)
POTASSIUM SERPL-SCNC: 4.1 MMOL/L (ref 3.7–5.3)
PROT UR STRIP-MCNC: ABNORMAL MG/DL
RBC # BLD AUTO: 4.25 M/UL (ref 4.21–5.77)
RBC #/AREA URNS HPF: ABNORMAL /HPF (ref 0–2)
SODIUM SERPL-SCNC: 139 MMOL/L (ref 135–144)
SP GR UR STRIP: >1.03 (ref 1.01–1.02)
UROBILINOGEN UR STRIP-ACNC: NORMAL EU/DL (ref 0–1)
WBC #/AREA URNS HPF: ABNORMAL /HPF (ref 0–5)
WBC OTHER # BLD: 9 K/UL (ref 3.5–11.3)
YEAST URNS QL MICRO: ABNORMAL

## 2024-01-01 PROCEDURE — 36415 COLL VENOUS BLD VENIPUNCTURE: CPT

## 2024-01-01 PROCEDURE — 99285 EMERGENCY DEPT VISIT HI MDM: CPT

## 2024-01-01 PROCEDURE — 85025 COMPLETE CBC W/AUTO DIFF WBC: CPT

## 2024-01-01 PROCEDURE — 87086 URINE CULTURE/COLONY COUNT: CPT

## 2024-01-01 PROCEDURE — 80048 BASIC METABOLIC PNL TOTAL CA: CPT

## 2024-01-01 PROCEDURE — 2580000003 HC RX 258: Performed by: EMERGENCY MEDICINE

## 2024-01-01 PROCEDURE — 81001 URINALYSIS AUTO W/SCOPE: CPT

## 2024-01-01 PROCEDURE — 6360000004 HC RX CONTRAST MEDICATION: Performed by: EMERGENCY MEDICINE

## 2024-01-01 PROCEDURE — 74174 CTA ABD&PLVS W/CONTRAST: CPT

## 2024-01-01 RX ORDER — SODIUM CHLORIDE, SODIUM LACTATE, POTASSIUM CHLORIDE, AND CALCIUM CHLORIDE .6; .31; .03; .02 G/100ML; G/100ML; G/100ML; G/100ML
1000 INJECTION, SOLUTION INTRAVENOUS ONCE
Status: COMPLETED | OUTPATIENT
Start: 2024-01-01 | End: 2024-01-01

## 2024-01-01 RX ADMIN — SODIUM CHLORIDE, POTASSIUM CHLORIDE, SODIUM LACTATE AND CALCIUM CHLORIDE 1000 ML: 600; 310; 30; 20 INJECTION, SOLUTION INTRAVENOUS at 18:40

## 2024-01-01 RX ADMIN — IOPAMIDOL 75 ML: 755 INJECTION, SOLUTION INTRAVENOUS at 19:07

## 2024-01-01 ASSESSMENT — PAIN DESCRIPTION - ORIENTATION: ORIENTATION: LEFT

## 2024-01-01 ASSESSMENT — PAIN - FUNCTIONAL ASSESSMENT: PAIN_FUNCTIONAL_ASSESSMENT: 0-10

## 2024-01-01 ASSESSMENT — ENCOUNTER SYMPTOMS
RECTAL PAIN: 0
ABDOMINAL PAIN: 0
BACK PAIN: 0
VOMITING: 0
SHORTNESS OF BREATH: 0
DIARRHEA: 0
NAUSEA: 0
COUGH: 0
SORE THROAT: 0

## 2024-01-01 ASSESSMENT — PAIN DESCRIPTION - LOCATION: LOCATION: FLANK

## 2024-01-01 ASSESSMENT — LIFESTYLE VARIABLES
HOW OFTEN DO YOU HAVE A DRINK CONTAINING ALCOHOL: MONTHLY OR LESS
HOW MANY STANDARD DRINKS CONTAINING ALCOHOL DO YOU HAVE ON A TYPICAL DAY: 1 OR 2

## 2024-01-01 ASSESSMENT — PAIN SCALES - GENERAL: PAINLEVEL_OUTOF10: 4

## 2024-01-01 NOTE — ED TRIAGE NOTES
Pt c/o increased hematuria x1 month. States he is passing clots. Current pain level to L flank 4/10. No meds taken for pain PTA. Sees Urology yearly in Feb. VSS.

## 2024-01-02 ENCOUNTER — TELEPHONE (OUTPATIENT)
Dept: UROLOGY | Age: 59
End: 2024-01-02

## 2024-01-02 ENCOUNTER — OFFICE VISIT (OUTPATIENT)
Dept: UROLOGY | Age: 59
End: 2024-01-02
Payer: COMMERCIAL

## 2024-01-02 VITALS
DIASTOLIC BLOOD PRESSURE: 83 MMHG | HEART RATE: 62 BPM | SYSTOLIC BLOOD PRESSURE: 145 MMHG | BODY MASS INDEX: 22.67 KG/M2 | TEMPERATURE: 98.2 F | WEIGHT: 158 LBS

## 2024-01-02 DIAGNOSIS — N30.01 ACUTE CYSTITIS WITH HEMATURIA: Primary | ICD-10-CM

## 2024-01-02 DIAGNOSIS — C64.2 MALIGNANT NEOPLASM OF LEFT KIDNEY, EXCEPT RENAL PELVIS (HCC): ICD-10-CM

## 2024-01-02 PROCEDURE — G8484 FLU IMMUNIZE NO ADMIN: HCPCS | Performed by: UROLOGY

## 2024-01-02 PROCEDURE — 99204 OFFICE O/P NEW MOD 45 MIN: CPT | Performed by: UROLOGY

## 2024-01-02 PROCEDURE — G8427 DOCREV CUR MEDS BY ELIG CLIN: HCPCS | Performed by: UROLOGY

## 2024-01-02 PROCEDURE — 3017F COLORECTAL CA SCREEN DOC REV: CPT | Performed by: UROLOGY

## 2024-01-02 PROCEDURE — G8420 CALC BMI NORM PARAMETERS: HCPCS | Performed by: UROLOGY

## 2024-01-02 PROCEDURE — 1036F TOBACCO NON-USER: CPT | Performed by: UROLOGY

## 2024-01-02 RX ORDER — FLUCONAZOLE 100 MG/1
100 TABLET ORAL DAILY
Qty: 7 TABLET | Refills: 0 | Status: SHIPPED | OUTPATIENT
Start: 2024-01-02 | End: 2024-01-09

## 2024-01-02 ASSESSMENT — ENCOUNTER SYMPTOMS
SHORTNESS OF BREATH: 0
BACK PAIN: 0
COLOR CHANGE: 0
CONSTIPATION: 0
NAUSEA: 0
WHEEZING: 0
VOMITING: 0
EYE REDNESS: 0
ABDOMINAL PAIN: 0
COUGH: 0

## 2024-01-02 NOTE — ED PROVIDER NOTES
[facility] ED  EMERGENCY DEPARTMENT ENCOUNTER    Pt Name: Saul Donahue  MRN: [id number]  Birthdate1965  Date of evaluation: 1/1/2024    Note Started: 6:14 PM EST    CHIEF COMPLAINT       Chief Complaint   Patient presents with    Hematuria     X1 month    Flank Pain     L         HISTORY OF PRESENT ILLNESS    Saul Donahue is a 58 y.o. male who presents with progressively worsening hematuria and right flank pain.  Patient has an extensive surgical history including left renal cancer status post removal as well as a prior AAA repair.  States that over the last week to 2 weeks he has been having gradually darkening urine but now nearly nicholas blood at the end of his stream.  Some right flank pain no injury no trauma.  No lightheadedness.  No bleeding any other sources.  He is on low-dose aspirin but no anticoagulation or other platelet agents.  Nausea but no vomiting no fevers.        REVIEW OF SYSTEMS       Review of Systems   Constitutional:  Negative for fever.   HENT:  Negative for congestion, nosebleeds and sore throat.    Eyes:  Negative for visual disturbance.   Respiratory:  Negative for cough and shortness of breath.    Cardiovascular:  Negative for chest pain.   Gastrointestinal:  Negative for abdominal pain, diarrhea, nausea, rectal pain and vomiting.   Genitourinary:  Positive for flank pain and hematuria. Negative for dysuria, penile discharge and testicular pain.   Musculoskeletal:  Negative for back pain.   Skin:  Negative for rash.   Neurological:  Negative for headaches.   Hematological:  Does not bruise/bleed easily.       PAST MEDICAL HISTORY    has a past medical history of Arthritis, Atherosclerosis of artery of both lower extremities (HCC), Awareness under anesthesia, Chronic back pain, COVID-19 vaccination not done, History of pleurisy, Hx of blood clots, Left anterior fascicular block (LAFB), Left renal mass, Pain management, Poor dentition, RBBB, S/P AAA 
abdomen and pelvis was performed with the administration of intravenous contrast. Multiplanar reformatted images are provided for review. MIP images are provided for review. Automated exposure control, iterative reconstruction, and/or weight based adjustment of the mA/kV was utilized to reduce the radiation dose to as low as reasonably achievable. COMPARISON: 06/05/2023 HISTORY: ORDERING SYSTEM PROVIDED HISTORY: flank pain, hematuria, hx AAA repair TECHNOLOGIST PROVIDED HISTORY: flank pain, hematuria, hx AAA repair FINDINGS: CTA ABDOMEN: Vascular: Again noted chronic occlusion of the infrarenal abdominal aorta and aorto bi-iliac graft, which appears patent throughout.  No aneurysm or dissection.  The celiac and superior mesenteric are patent.  The origin of the RADHA is not visualized but appears to arise near the left iliac artery. The visualized segments of the RADHA is patent.  Single bilateral renal arteries are patent. Lower chest: Unremarkable. Liver: ?Normal size and contour. No focal lesion. Gallbladder/biliary tree: Several tiny stones versus sludge and possible 4 mm polyp. No biliary diliation. ?Adrenal glands: ?Unremarkable Spleen: ?Unremarkable Pancreas: ?unremarkable Kidneys: Status post left nephrectomy.  Right kidney appears unremarkable with normal corticomedullary enhancement without hydronephrosis or focal lesion.  No nephrolithiasis or urolithiasis. Bowel/GI tract: ? Stomach is markedly distended with ingested material. Bowel is normal in caliber without obstruction. No abnormal wall thickening. Appendix is normal in caliber. ?Lymph Nodes: ?No enlarged adenopathy Peritoneum/retroperitoneum: ? No ascites or free air. No fluid collections. Vascular: ?Abdominal aorta is normal in caliber without aneurysm or dissection. Patent IVC. Abdominal wall: ?Unremarkable ? Bones: L4-5 degenerative disc disease.No acute or suspicious bony abnormality. Soft Tissues: ?Unremarkable CTA PELVIS: Vascular : Retrograde

## 2024-01-02 NOTE — PROGRESS NOTES
flank pain, frequency, penile discharge, penile pain, scrotal swelling, testicular pain and urgency.   Musculoskeletal:  Negative for back pain, joint swelling and myalgias.   Skin:  Negative for color change, rash and wound.   Neurological:  Negative for dizziness, tremors and numbness.   Hematological:  Negative for adenopathy. Does not bruise/bleed easily.       BP (!) 145/83 (Site: Left Upper Arm, Position: Sitting, Cuff Size: Medium Adult)   Pulse 62   Temp 98.2 °F (36.8 °C)   Wt 71.7 kg (158 lb)   BMI 22.67 kg/m²       PHYSICAL EXAM:  Constitutional: Patient in no acute distress;   Neuro: alert and oriented to person place and time.    Psych: Mood and affect normal.  Skin: Normal  Lungs: Respiratory effort normal  Cardiovascular:  Normal peripheral pulses  Abdomen: Soft, non-tender, non-distended with no CVA, flank pain  Bladder non-tender and not distended.  Lymphatics: no palpable lymphadenopathy  Penis normal  Urethral meatus normal  Scrotal exam normal  Testicles normal bilaterally  Epididymis normal bilaterally  No evidence of inguinal hernia      Lab Results   Component Value Date    BUN 24 (H) 01/01/2024     Lab Results   Component Value Date    CREATININE 1.3 (H) 01/01/2024     No results found for: \"PSA\"    ASSESSMENT:  This is a 58 y.o. male with the following diagnoses:   Diagnosis Orders   1. Acute cystitis with hematuria  fluconazole (DIFLUCAN) 100 MG tablet      2. Malignant neoplasm of left kidney, except renal pelvis (HCC)             PLAN:  We will start him on 7 days of diflucan. Follow culture. Return for cystoscopy to complete hematuria w/u. May consider general surgery consultation to rule out adhesions due to pt's multiple abdominal surgeries. We will then plan for appropriate imaging f/u for his kidney cancer.

## 2024-01-02 NOTE — DISCHARGE INSTRUCTIONS
Please follow-up with urology as discussed, return to ER for worsening abdominal pain inability to urinate, or fevers.

## 2024-01-02 NOTE — TELEPHONE ENCOUNTER
Please call patient and make sure he is going to follow-up with urology.  It does appear as though he has had a nephrectomy earlier this year by Dr. Gomez.  If patient does not have a urologist that he is following with since his nephrectomy would be happy to see him otherwise he should see his prior urologist

## 2024-01-03 LAB
MICROORGANISM SPEC CULT: NORMAL
SPECIMEN DESCRIPTION: NORMAL

## 2024-01-11 ENCOUNTER — PROCEDURE VISIT (OUTPATIENT)
Dept: UROLOGY | Age: 59
End: 2024-01-11

## 2024-01-11 VITALS
WEIGHT: 160 LBS | BODY MASS INDEX: 22.96 KG/M2 | DIASTOLIC BLOOD PRESSURE: 68 MMHG | TEMPERATURE: 97 F | SYSTOLIC BLOOD PRESSURE: 110 MMHG

## 2024-01-11 DIAGNOSIS — C64.2 MALIGNANT NEOPLASM OF LEFT KIDNEY, EXCEPT RENAL PELVIS (HCC): ICD-10-CM

## 2024-01-11 DIAGNOSIS — N30.01 ACUTE CYSTITIS WITH HEMATURIA: Primary | ICD-10-CM

## 2024-01-11 DIAGNOSIS — N50.819 PAIN IN TESTICLE, UNSPECIFIED LATERALITY: ICD-10-CM

## 2024-01-11 RX ORDER — SULFAMETHOXAZOLE AND TRIMETHOPRIM 800; 160 MG/1; MG/1
1 TABLET ORAL 2 TIMES DAILY
Qty: 28 TABLET | Refills: 0 | Status: SHIPPED | OUTPATIENT
Start: 2024-01-11 | End: 2024-01-25

## 2024-01-11 NOTE — PROGRESS NOTES
During cystoscopy the following was utilized on patient with no adverse affects:    45% SODIUM CHLORIDE 500 ML BAG  Lot number: [id number]  Expiration date: 10/2024      LIDOCAINE HYDROCHLORIDE JELLY 2%   Lot number: [id number]  Expiration date: 07/2025     CYSTOSCOPE   Lot number: [id number]  Expiration date: 10/12/2026   
clear from a gross hematuria standpoint.  We will order repeat surveillance testing for him in 6 months.  Patient will get a scrotal ultrasound for his testicular pain.  We will plan for follow-up after this.  We did place him on 2 weeks of antibiotics as well.

## 2024-01-17 ENCOUNTER — HOSPITAL ENCOUNTER (OUTPATIENT)
Age: 59
Discharge: HOME | End: 2024-01-17
Attending: UROLOGY
Payer: COMMERCIAL

## 2024-01-17 ENCOUNTER — HOSPITAL ENCOUNTER (OUTPATIENT)
Age: 59
Discharge: HOME | End: 2024-01-19
Attending: UROLOGY
Payer: COMMERCIAL

## 2024-01-17 ENCOUNTER — HOSPITAL ENCOUNTER (OUTPATIENT)
Dept: GENERAL RADIOLOGY | Age: 59
Discharge: HOME | End: 2024-01-19
Attending: UROLOGY
Payer: COMMERCIAL

## 2024-01-17 ENCOUNTER — HOSPITAL ENCOUNTER (OUTPATIENT)
Dept: ULTRASOUND IMAGING | Age: 59
Discharge: HOME | End: 2024-01-19
Attending: UROLOGY
Payer: COMMERCIAL

## 2024-01-17 DIAGNOSIS — N50.819 PAIN IN TESTICLE, UNSPECIFIED LATERALITY: ICD-10-CM

## 2024-01-17 DIAGNOSIS — C64.2 MALIGNANT NEOPLASM OF LEFT KIDNEY, EXCEPT RENAL PELVIS (HCC): ICD-10-CM

## 2024-01-17 PROCEDURE — 93976 VASCULAR STUDY: CPT

## 2024-01-17 PROCEDURE — 71046 X-RAY EXAM CHEST 2 VIEWS: CPT

## 2024-02-14 ENCOUNTER — OFFICE VISIT (OUTPATIENT)
Dept: UROLOGY | Age: 59
End: 2024-02-14
Payer: COMMERCIAL

## 2024-02-14 VITALS
SYSTOLIC BLOOD PRESSURE: 130 MMHG | BODY MASS INDEX: 23.39 KG/M2 | TEMPERATURE: 98.5 F | HEART RATE: 58 BPM | DIASTOLIC BLOOD PRESSURE: 77 MMHG | WEIGHT: 163 LBS

## 2024-02-14 DIAGNOSIS — R10.30 LOWER ABDOMINAL PAIN: ICD-10-CM

## 2024-02-14 DIAGNOSIS — C64.2 MALIGNANT NEOPLASM OF LEFT KIDNEY, EXCEPT RENAL PELVIS (HCC): Primary | ICD-10-CM

## 2024-02-14 PROCEDURE — 99213 OFFICE O/P EST LOW 20 MIN: CPT | Performed by: PHYSICIAN ASSISTANT

## 2024-02-14 NOTE — PROGRESS NOTES
HPI:      Patient is a 58 y.o. male in no acute distress.  He is alert and oriented to person, place, and time.       Patient being seen here today as a new patient.  Patient does have a history of having left renal mass. Patient did have a radical nephrectomy on the left done 1/25/2023.  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  This did have vascular invasion.  Margins were clear.  The tumor did measure 6.2 cm.   Staging was pT3a.  Patient did have a recent CT scan.  Patient did have a recent CT scan of the abdomen pelvis.  This film was independently reviewed.  This does show left nephrectomy otherwise there are no significant  abnormalities.  There is no evidence of metastasis.  Patient did have a recent creatinine.  This value was 1.3.  Patient did develop gross hematuria.  Patient did have yeast on his urinalysis.  Patient's urine culture is still pending. PT does have diffuse abdominal pain. Currently this pain is more on the left than the right.      Right testicular trauma as a child, right testicle is atrophied to nothing    1/2024 -gross hematuria - Normal anatomy, prostatic varices    Today:  Patient is here today for follow-up scrotal ultrasound.  At last visit patient was having left-sided testicular pain.  Patient now has right-sided pain.  Scrotal ultrasound from last month showed nonvisualization of the right testicle.  Left testicle appears normal.  Patient states that he had trauma to his right testicle when he was younger and was told that it would atrophy away.  He states his pain is 10 out of 10.  He is resting comfortably in the office.    When patient points to locate his pain it is bilaterally at the level of the umbilicus on palpation this is painful.  Patient did have prior removal of pain pump and did have radical nephrectomy approximately a year ago.  Pain is possibly secondary to adhesions.    Past Medical History:   Diagnosis Date    Arthritis     Atherosclerosis of artery

## 2024-02-28 PROBLEM — R10.32 LLQ PAIN: Status: ACTIVE | Noted: 2024-02-28

## 2024-04-10 PROBLEM — G57.92 ILIOINGUINAL NEURALGIA OF LEFT SIDE: Status: ACTIVE | Noted: 2024-04-10

## 2024-04-10 PROBLEM — G57.80: Status: ACTIVE | Noted: 2024-04-10

## 2024-07-15 ENCOUNTER — HOSPITAL ENCOUNTER (OUTPATIENT)
Age: 59
Discharge: HOME | End: 2024-07-17
Payer: COMMERCIAL

## 2024-07-15 ENCOUNTER — HOSPITAL ENCOUNTER (OUTPATIENT)
Dept: CT IMAGING | Age: 59
Discharge: HOME | End: 2024-07-17
Payer: COMMERCIAL

## 2024-07-15 ENCOUNTER — HOSPITAL ENCOUNTER (OUTPATIENT)
Age: 59
Discharge: HOME | End: 2024-07-15
Payer: COMMERCIAL

## 2024-07-15 DIAGNOSIS — C64.2 MALIGNANT NEOPLASM OF LEFT KIDNEY, EXCEPT RENAL PELVIS (HCC): ICD-10-CM

## 2024-07-15 LAB
ALBUMIN SERPL-MCNC: 4.3 G/DL (ref 3.5–5.2)
ALBUMIN/GLOB SERPL: 1.9 {RATIO} (ref 1–2.5)
ALP SERPL-CCNC: 102 U/L (ref 40–129)
ALT SERPL-CCNC: 19 U/L (ref 5–41)
ANION GAP SERPL CALCULATED.3IONS-SCNC: 8 MMOL/L (ref 9–17)
AST SERPL-CCNC: 15 U/L
BASOPHILS # BLD: 0.03 K/UL (ref 0–0.2)
BASOPHILS NFR BLD: 0 % (ref 0–2)
BILIRUB SERPL-MCNC: 0.2 MG/DL (ref 0.3–1.2)
BUN SERPL-MCNC: 17 MG/DL (ref 6–20)
BUN/CREAT SERPL: 13 (ref 9–20)
CALCIUM SERPL-MCNC: 9.2 MG/DL (ref 8.6–10.4)
CHLORIDE SERPL-SCNC: 108 MMOL/L (ref 98–107)
CO2 SERPL-SCNC: 26 MMOL/L (ref 20–31)
CREAT SERPL-MCNC: 1.3 MG/DL (ref 0.7–1.2)
EOSINOPHIL # BLD: 0.17 K/UL (ref 0–0.44)
EOSINOPHILS RELATIVE PERCENT: 2 % (ref 1–4)
ERYTHROCYTE [DISTWIDTH] IN BLOOD BY AUTOMATED COUNT: 15.1 % (ref 11.8–14.4)
GFR, ESTIMATED: 63 ML/MIN/1.73M2
GLUCOSE SERPL-MCNC: 88 MG/DL (ref 70–99)
HCT VFR BLD AUTO: 36.6 % (ref 40.7–50.3)
HGB BLD-MCNC: 12.1 G/DL (ref 13–17)
IMM GRANULOCYTES # BLD AUTO: 0.07 K/UL (ref 0–0.3)
IMM GRANULOCYTES NFR BLD: 1 %
LYMPHOCYTES NFR BLD: 2.45 K/UL (ref 1.1–3.7)
LYMPHOCYTES RELATIVE PERCENT: 23 % (ref 24–43)
MCH RBC QN AUTO: 29.7 PG (ref 25.2–33.5)
MCHC RBC AUTO-ENTMCNC: 33.1 G/DL (ref 28.4–34.8)
MCV RBC AUTO: 89.9 FL (ref 82.6–102.9)
MONOCYTES NFR BLD: 0.79 K/UL (ref 0.1–1.2)
MONOCYTES NFR BLD: 8 % (ref 3–12)
NEUTROPHILS NFR BLD: 66 % (ref 36–65)
NEUTS SEG NFR BLD: 7.07 K/UL (ref 1.5–8.1)
NRBC BLD-RTO: 0 PER 100 WBC
PLATELET # BLD AUTO: 137 K/UL (ref 138–453)
PMV BLD AUTO: 10.8 FL (ref 8.1–13.5)
POTASSIUM SERPL-SCNC: 4.5 MMOL/L (ref 3.7–5.3)
PROT SERPL-MCNC: 6.6 G/DL (ref 6.4–8.3)
RBC # BLD AUTO: 4.07 M/UL (ref 4.21–5.77)
SODIUM SERPL-SCNC: 142 MMOL/L (ref 135–144)
WBC OTHER # BLD: 10.6 K/UL (ref 3.5–11.3)

## 2024-07-15 PROCEDURE — 85025 COMPLETE CBC W/AUTO DIFF WBC: CPT

## 2024-07-15 PROCEDURE — 6360000004 HC RX CONTRAST MEDICATION: Performed by: PHYSICIAN ASSISTANT

## 2024-07-15 PROCEDURE — 71260 CT THORAX DX C+: CPT

## 2024-07-15 PROCEDURE — 80053 COMPREHEN METABOLIC PANEL: CPT

## 2024-07-15 PROCEDURE — 36415 COLL VENOUS BLD VENIPUNCTURE: CPT

## 2024-07-15 RX ADMIN — IOPAMIDOL 75 ML: 755 INJECTION, SOLUTION INTRAVENOUS at 19:06

## 2024-07-25 ENCOUNTER — PROCEDURE VISIT (OUTPATIENT)
Dept: UROLOGY | Age: 59
End: 2024-07-25
Payer: COMMERCIAL

## 2024-07-25 VITALS
TEMPERATURE: 98.4 F | SYSTOLIC BLOOD PRESSURE: 123 MMHG | BODY MASS INDEX: 23.68 KG/M2 | DIASTOLIC BLOOD PRESSURE: 74 MMHG | HEART RATE: 67 BPM | WEIGHT: 165 LBS

## 2024-07-25 DIAGNOSIS — C64.2 MALIGNANT NEOPLASM OF LEFT KIDNEY, EXCEPT RENAL PELVIS (HCC): Primary | ICD-10-CM

## 2024-07-25 DIAGNOSIS — R31.0 GROSS HEMATURIA: ICD-10-CM

## 2024-07-25 PROCEDURE — 52000 CYSTOURETHROSCOPY: CPT | Performed by: UROLOGY

## 2024-07-25 PROCEDURE — 99214 OFFICE O/P EST MOD 30 MIN: CPT | Performed by: UROLOGY

## 2024-07-25 NOTE — PROGRESS NOTES
HPI:          Patient is a 59 y.o. male with gross hematuria and history of renal cell carcinoma    History  Patient being seen here today as a new patient.  Patient does have a history of having left renal mass. Patient did have a radical nephrectomy on the left done 1/25/2023.  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  This did have vascular invasion.  Margins were clear.  The tumor did measure 6.2 cm.   Staging was pT3a.  Patient did have a recent CT scan.  Patient did have a recent CT scan of the abdomen pelvis.  This film was independently reviewed.  This does show left nephrectomy otherwise there are no significant  abnormalities.  There is no evidence of metastasis.  Patient did have a recent creatinine.  This value was 1.3.  Patient did develop gross hematuria.  Patient did have yeast on his urinalysis.  Patient's urine culture is still pending. PT does have diffuse abdominal pain. Currently this pain is more on the left than the right.       Right testicular trauma as a child, right testicle is atrophied to nothing     1/2024 -gross hematuria - Normal anatomy, prostatic varices    Currently  Patient is here today for 6-month follow-up.  Patient did have a recent CT scan.  This film was independently reviewed.  There is no evidence of any metastatic disease in the abdomen chest or pelvis.  Patient has a normal right kidney.  Patient did have an episode of gross hematuria so we are here today for lower tract visualization.  Patient did also have a full panel of labs.  No significant abnormalities in these laboratory values.  Patient has been doing well.  He has had no recent gross hematuria or dysuria.  No pain today    Cystoscopy Procedure Note    Pre-operative Diagnosis: gross hematuria    Post-operative Diagnosis: Same     Surgeon: Sia    Assistants: None    Anesthesia : Local    Procedure Details   The risks, benefits, complications, treatment options, and expected outcomes were

## 2024-07-25 NOTE — PROGRESS NOTES
During cystoscopy the following was utilized on patient with no adverse affects:    45% SODIUM CHLORIDE 500 ML BAG  Lot number: [id number]  Expiration date: 5/2025      LIDOCAINE HYDROCHLORIDE JELLY 2%   Lot number: [id number]  Expiration date: 1/2026    CYSTOSCOPE  LOT#[id number]  EXP.DATE: 6/28/2026

## 2025-01-29 ENCOUNTER — HOSPITAL ENCOUNTER (OUTPATIENT)
Dept: CT IMAGING | Age: 60
Discharge: HOME | End: 2025-01-31
Attending: UROLOGY
Payer: COMMERCIAL

## 2025-01-29 ENCOUNTER — HOSPITAL ENCOUNTER (OUTPATIENT)
Age: 60
Discharge: HOME | End: 2025-01-29
Attending: UROLOGY
Payer: COMMERCIAL

## 2025-01-29 DIAGNOSIS — C64.2 MALIGNANT NEOPLASM OF LEFT KIDNEY, EXCEPT RENAL PELVIS (HCC): ICD-10-CM

## 2025-01-29 LAB
ALBUMIN SERPL-MCNC: 4.4 G/DL (ref 3.5–5.2)
ALBUMIN/GLOB SERPL: 1.9 {RATIO} (ref 1–2.5)
ALP SERPL-CCNC: 88 U/L (ref 40–129)
ALT SERPL-CCNC: 21 U/L (ref 10–50)
ANION GAP SERPL CALCULATED.3IONS-SCNC: 8 MMOL/L (ref 9–16)
AST SERPL-CCNC: 21 U/L (ref 10–50)
BASOPHILS # BLD: 0.04 K/UL (ref 0–0.2)
BASOPHILS NFR BLD: 0 % (ref 0–2)
BILIRUB SERPL-MCNC: 0.3 MG/DL (ref 0–1.2)
BUN SERPL-MCNC: 24 MG/DL (ref 6–20)
BUN/CREAT SERPL: 18 (ref 9–20)
CALCIUM SERPL-MCNC: 9 MG/DL (ref 8.6–10.4)
CHLORIDE SERPL-SCNC: 107 MMOL/L (ref 98–107)
CO2 SERPL-SCNC: 25 MMOL/L (ref 20–31)
CREAT SERPL-MCNC: 1.3 MG/DL (ref 0.7–1.2)
EOSINOPHIL # BLD: <0.03 K/UL (ref 0–0.44)
EOSINOPHILS RELATIVE PERCENT: 0 % (ref 1–4)
ERYTHROCYTE [DISTWIDTH] IN BLOOD BY AUTOMATED COUNT: 15.7 % (ref 11.8–14.4)
GFR, ESTIMATED: 63 ML/MIN/1.73M2
GLUCOSE SERPL-MCNC: 102 MG/DL (ref 74–99)
HCT VFR BLD AUTO: 37.3 % (ref 40.7–50.3)
HGB BLD-MCNC: 12.3 G/DL (ref 13–17)
IMM GRANULOCYTES # BLD AUTO: 0.08 K/UL (ref 0–0.3)
IMM GRANULOCYTES NFR BLD: 1 %
LYMPHOCYTES NFR BLD: 1.46 K/UL (ref 1.1–3.7)
LYMPHOCYTES RELATIVE PERCENT: 10 % (ref 24–43)
MCH RBC QN AUTO: 29.5 PG (ref 25.2–33.5)
MCHC RBC AUTO-ENTMCNC: 33 G/DL (ref 28.4–34.8)
MCV RBC AUTO: 89.4 FL (ref 82.6–102.9)
MONOCYTES NFR BLD: 0.64 K/UL (ref 0.1–1.2)
MONOCYTES NFR BLD: 4 % (ref 3–12)
NEUTROPHILS NFR BLD: 85 % (ref 36–65)
NEUTS SEG NFR BLD: 12.37 K/UL (ref 1.5–8.1)
NRBC BLD-RTO: 0 PER 100 WBC
PLATELET # BLD AUTO: 147 K/UL (ref 138–453)
PMV BLD AUTO: 10.9 FL (ref 8.1–13.5)
POTASSIUM SERPL-SCNC: 5.4 MMOL/L (ref 3.7–5.3)
PROT SERPL-MCNC: 6.8 G/DL (ref 6.6–8.7)
RBC # BLD AUTO: 4.17 M/UL (ref 4.21–5.77)
SODIUM SERPL-SCNC: 140 MMOL/L (ref 136–145)
WBC OTHER # BLD: 14.6 K/UL (ref 3.5–11.3)

## 2025-01-29 PROCEDURE — 36415 COLL VENOUS BLD VENIPUNCTURE: CPT

## 2025-01-29 PROCEDURE — 6360000004 HC RX CONTRAST MEDICATION: Performed by: UROLOGY

## 2025-01-29 PROCEDURE — 85025 COMPLETE CBC W/AUTO DIFF WBC: CPT

## 2025-01-29 PROCEDURE — 71260 CT THORAX DX C+: CPT

## 2025-01-29 PROCEDURE — 80053 COMPREHEN METABOLIC PANEL: CPT

## 2025-01-29 RX ORDER — IOPAMIDOL 755 MG/ML
75 INJECTION, SOLUTION INTRAVASCULAR
Status: COMPLETED | OUTPATIENT
Start: 2025-01-29 | End: 2025-01-29

## 2025-01-29 RX ADMIN — IOPAMIDOL 75 ML: 755 INJECTION, SOLUTION INTRAVENOUS at 17:35

## 2025-02-06 ENCOUNTER — HOSPITAL ENCOUNTER (OUTPATIENT)
Age: 60
Setting detail: SPECIMEN
Discharge: HOME | End: 2025-02-06
Payer: COMMERCIAL

## 2025-02-06 ENCOUNTER — OFFICE VISIT (OUTPATIENT)
Dept: UROLOGY | Age: 60
End: 2025-02-06
Payer: COMMERCIAL

## 2025-02-06 VITALS
TEMPERATURE: 97.3 F | BODY MASS INDEX: 23.53 KG/M2 | HEART RATE: 45 BPM | SYSTOLIC BLOOD PRESSURE: 132 MMHG | WEIGHT: 164 LBS | DIASTOLIC BLOOD PRESSURE: 82 MMHG

## 2025-02-06 DIAGNOSIS — N13.8 BPH WITH OBSTRUCTION/LOWER URINARY TRACT SYMPTOMS: ICD-10-CM

## 2025-02-06 DIAGNOSIS — C64.2 MALIGNANT NEOPLASM OF LEFT KIDNEY, EXCEPT RENAL PELVIS (HCC): Primary | ICD-10-CM

## 2025-02-06 DIAGNOSIS — C64.2 MALIGNANT NEOPLASM OF LEFT KIDNEY, EXCEPT RENAL PELVIS (HCC): ICD-10-CM

## 2025-02-06 DIAGNOSIS — N40.1 BPH WITH OBSTRUCTION/LOWER URINARY TRACT SYMPTOMS: ICD-10-CM

## 2025-02-06 LAB
BILIRUB UR QL STRIP: NEGATIVE
CLARITY UR: CLEAR
COLOR UR: YELLOW
EPI CELLS #/AREA URNS HPF: NORMAL /HPF (ref 0–5)
GLUCOSE UR STRIP-MCNC: NEGATIVE MG/DL
HGB UR QL STRIP.AUTO: NEGATIVE
KETONES UR STRIP-MCNC: NEGATIVE MG/DL
LEUKOCYTE ESTERASE UR QL STRIP: NEGATIVE
NITRITE UR QL STRIP: NEGATIVE
PH UR STRIP: 6 [PH] (ref 5–9)
PROT UR STRIP-MCNC: NEGATIVE MG/DL
RBC #/AREA URNS HPF: NORMAL /HPF (ref 0–2)
SP GR UR STRIP: 1.02 (ref 1.01–1.02)
UROBILINOGEN UR STRIP-ACNC: NORMAL EU/DL (ref 0–1)
WBC #/AREA URNS HPF: NORMAL /HPF (ref 0–5)

## 2025-02-06 PROCEDURE — 51798 US URINE CAPACITY MEASURE: CPT | Performed by: UROLOGY

## 2025-02-06 PROCEDURE — 81001 URINALYSIS AUTO W/SCOPE: CPT

## 2025-02-06 PROCEDURE — 87086 URINE CULTURE/COLONY COUNT: CPT

## 2025-02-06 PROCEDURE — 99214 OFFICE O/P EST MOD 30 MIN: CPT | Performed by: UROLOGY

## 2025-02-06 RX ORDER — TAMSULOSIN HYDROCHLORIDE 0.4 MG/1
0.4 CAPSULE ORAL DAILY
Qty: 30 CAPSULE | Refills: 5 | Status: SHIPPED | OUTPATIENT
Start: 2025-02-06

## 2025-02-06 NOTE — PROGRESS NOTES
HPI:          Patient is a 59 y.o. male in no acute distress.  He is alert and oriented to person, place, and time.         History  Patient being seen here today as a new patient.  Patient does have a history of having left renal mass. Patient did have a radical nephrectomy on the left done 1/25/2023.  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  This did have vascular invasion.  Margins were clear.  The tumor did measure 6.2 cm.   Staging was pT3a.  Patient did have a recent CT scan.  Patient did have a recent CT scan of the abdomen pelvis.  This film was independently reviewed.  This does show left nephrectomy otherwise there are no significant  abnormalities.  There is no evidence of metastasis.  Patient did have a recent creatinine.  This value was 1.3.  Patient did develop gross hematuria.  Patient did have yeast on his urinalysis.  Patient's urine culture is still pending. PT does have diffuse abdominal pain. Currently this pain is more on the left than the right.       Right testicular trauma as a child, right testicle is atrophied to nothing     1/2024 -gross hematuria - Normal anatomy, prostatic varices     Currently  Patient is here today for 6-month follow-up.  Patient did get repeat lab work as well as CT scan.  Patient's scan and CT abdomen pelvis and chest.  This did not show any evidence of metastasis.  Patient did have gallstones.  He was to have a pancreatic cyst.  No other significant abdominal or  abnormalities..  Patient's lab work does show stable creatinine and GFR at 63 and 1.3.  Patient does have a slightly elevated white blood cell count at 14.6.  Patient is complaining about some worsening nocturia.  We had briefly discussed alpha-blocker therapy in the past.  Past Medical History:   Diagnosis Date    Arthritis     Atherosclerosis of artery of both lower extremities (HCC)     Awareness under anesthesia 2012    states toward the end of his pain pump removal in [place] 2012

## 2025-02-07 LAB
MICROORGANISM SPEC CULT: NO GROWTH
SERVICE CMNT-IMP: NORMAL
SPECIMEN DESCRIPTION: NORMAL

## 2025-02-10 ENCOUNTER — TELEPHONE (OUTPATIENT)
Dept: UROLOGY | Age: 60
End: 2025-02-10

## 2025-02-10 NOTE — TELEPHONE ENCOUNTER
----- Message from JC Castaneda - CNP sent at 2/10/2025 10:05 AM EST -----  Call pt - urine cx reviewed and negative for UTI & for significant microhematuria

## 2025-02-10 NOTE — TELEPHONE ENCOUNTER
Phone call to patient's wife OK per HIPAA form. Patient wife informed of results and states she will relay information to Dennis

## 2025-05-14 ENCOUNTER — TELEPHONE (OUTPATIENT)
Dept: UROLOGY | Age: 60
End: 2025-05-14

## 2025-05-14 DIAGNOSIS — R31.0 GROSS HEMATURIA: Primary | ICD-10-CM

## 2025-05-14 NOTE — TELEPHONE ENCOUNTER
Patient is urinating blood and has dime size blood clots. Slight stomach pains on both sides. Has back pain but has had surgeries on back.  He wants to know of he should stay on the on the Flomax since he is bleeding.  Her wants to know if you can give him the med he was before for the bleeding.

## 2025-05-14 NOTE — TELEPHONE ENCOUNTER
Please have him drop off a urine culture to the lab to rule out infection as a source of his bleeding.    We want him to stay on the Flomax    Please make an appointment sometime in the next 2 or 3 weeks to discuss the blood in the urine (dr oscar please)

## 2025-05-15 ENCOUNTER — HOSPITAL ENCOUNTER (OUTPATIENT)
Age: 60
Discharge: HOME | End: 2025-05-15
Payer: COMMERCIAL

## 2025-05-15 DIAGNOSIS — R31.0 GROSS HEMATURIA: ICD-10-CM

## 2025-05-15 PROCEDURE — 87086 URINE CULTURE/COLONY COUNT: CPT

## 2025-05-15 NOTE — TELEPHONE ENCOUNTER
Patient called and said he came last night to do the urine culture and there is no order in there.

## 2025-05-16 LAB
MICROORGANISM SPEC CULT: NORMAL
SERVICE CMNT-IMP: NORMAL
SPECIMEN DESCRIPTION: NORMAL

## 2025-05-19 ENCOUNTER — RESULTS FOLLOW-UP (OUTPATIENT)
Dept: UROLOGY | Age: 60
End: 2025-05-19

## 2025-05-19 NOTE — TELEPHONE ENCOUNTER
Ntfd Patient of the provider's result/response, he questioned why is he still having blood in his urine with no infection.  He states he is voiding dime sized clots for about 2 weeks and states he is started to have abdominal pain now.    He does have a follow up appt on 6/2 with Dr. ESTEBAN to discuss the blood in his urine.

## 2025-05-19 NOTE — TELEPHONE ENCOUNTER
Phone call to patient, patient informed of provider's comment and voiced understanding with  no further questions,.

## 2025-05-19 NOTE — TELEPHONE ENCOUNTER
----- Message from Kendall Melendez PA-C sent at 5/19/2025  8:43 AM EDT -----  Please let him know urine culture was negative for UTI.

## 2025-06-02 ENCOUNTER — HOSPITAL ENCOUNTER (OUTPATIENT)
Age: 60
Setting detail: SPECIMEN
Discharge: HOME | End: 2025-06-02
Payer: COMMERCIAL

## 2025-06-02 ENCOUNTER — OFFICE VISIT (OUTPATIENT)
Dept: UROLOGY | Age: 60
End: 2025-06-02
Payer: COMMERCIAL

## 2025-06-02 VITALS
SYSTOLIC BLOOD PRESSURE: 130 MMHG | BODY MASS INDEX: 23.68 KG/M2 | WEIGHT: 165 LBS | DIASTOLIC BLOOD PRESSURE: 80 MMHG | TEMPERATURE: 98 F

## 2025-06-02 DIAGNOSIS — R10.30 LOWER ABDOMINAL PAIN: ICD-10-CM

## 2025-06-02 DIAGNOSIS — C64.2 MALIGNANT NEOPLASM OF LEFT KIDNEY, EXCEPT RENAL PELVIS (HCC): ICD-10-CM

## 2025-06-02 DIAGNOSIS — R31.0 GROSS HEMATURIA: Primary | ICD-10-CM

## 2025-06-02 DIAGNOSIS — R31.0 GROSS HEMATURIA: ICD-10-CM

## 2025-06-02 LAB
AMORPH SED URNS QL MICRO: ABNORMAL
BACTERIA URNS QL MICRO: ABNORMAL
BILIRUB UR QL STRIP: ABNORMAL
CHARACTER UR: ABNORMAL
CLARITY UR: ABNORMAL
COLOR UR: ABNORMAL
EPI CELLS #/AREA URNS HPF: ABNORMAL /HPF (ref 0–5)
GLUCOSE UR STRIP-MCNC: ABNORMAL MG/DL
HGB UR QL STRIP.AUTO: ABNORMAL
KETONES UR STRIP-MCNC: ABNORMAL MG/DL
LEUKOCYTE ESTERASE UR QL STRIP: ABNORMAL
NITRITE UR QL STRIP: ABNORMAL
PH UR STRIP: ABNORMAL [PH] (ref 5–9)
PROT UR STRIP-MCNC: ABNORMAL MG/DL
RBC #/AREA URNS HPF: ABNORMAL /HPF (ref 0–2)
SP GR UR STRIP: 1.02 (ref 1.01–1.02)
UROBILINOGEN UR STRIP-ACNC: ABNORMAL EU/DL (ref 0–1)
WBC #/AREA URNS HPF: ABNORMAL /HPF (ref 0–5)

## 2025-06-02 PROCEDURE — 81001 URINALYSIS AUTO W/SCOPE: CPT

## 2025-06-02 PROCEDURE — 51798 US URINE CAPACITY MEASURE: CPT | Performed by: UROLOGY

## 2025-06-02 PROCEDURE — 99214 OFFICE O/P EST MOD 30 MIN: CPT | Performed by: UROLOGY

## 2025-06-02 PROCEDURE — 87086 URINE CULTURE/COLONY COUNT: CPT

## 2025-06-02 RX ORDER — SULFAMETHOXAZOLE AND TRIMETHOPRIM 800; 160 MG/1; MG/1
1 TABLET ORAL 2 TIMES DAILY
Qty: 14 TABLET | Refills: 0 | Status: SHIPPED | OUTPATIENT
Start: 2025-06-02 | End: 2025-06-09

## 2025-06-02 NOTE — PROGRESS NOTES
History  Right testicular trauma as a child, right testicle is atrophied to nothing    1/2023 left radical nephrectomy. Pathology:  clear-cell renal cell carcinoma, vascular invasion, margins were clear.  The tumor did measure 6.2 cm. pT3a.      1/2024 referred for history of RCC and new gross hematuria. CT showed left nephrectomy otherwise there are no significant  abnormalities.  There is no evidence of metastasis.  Recent creatinine, 1.3. Patient did develop gross hematuria.      Cystoscopy showed normal anatomy    2/2025 worsening nocturia   Started flomax      Today  Here today to follow-up for BPH and history of renal cell carcinoma.  He is taking Flomax daily.  Patient did disclose that he has urinating blood and having dime size clots passed.  And he is having some abdominal pain.    This is a 60 y.o. male with the following diagnoses:   Diagnosis Orders   1. Gross hematuria  RAMONITA,POST-VOID RES,US,NON-IMAGING    CT CHEST ABDOMEN PELVIS W WO CONTRAST Additional Contrast? None    BUN & Creatinine    Urinalysis with Microscopic    Culture, Urine    sulfamethoxazole-trimethoprim (BACTRIM DS;SEPTRA DS) 800-160 MG per tablet      2. Malignant neoplasm of left kidney, except renal pelvis (HCC)  RAMONITA,POST-VOID RES,US,NON-IMAGING    CT CHEST ABDOMEN PELVIS W WO CONTRAST Additional Contrast? None    BUN & Creatinine    Urinalysis with Microscopic    Culture, Urine    sulfamethoxazole-trimethoprim (BACTRIM DS;SEPTRA DS) 800-160 MG per tablet      3. Lower abdominal pain  RAMONITA,POST-VOID RES,US,NON-IMAGING    CT CHEST ABDOMEN PELVIS W WO CONTRAST Additional Contrast? None    BUN & Creatinine    Urinalysis with Microscopic    Culture, Urine    sulfamethoxazole-trimethoprim (BACTRIM DS;SEPTRA DS) 800-160 MG per tablet         Plan  Patient will have his urine checked for culture and sensitivity.  We did start him on empiric Bactrim today.  Patient will follow-up with us for cystoscopy and CT scan.  We are moving his

## 2025-06-02 NOTE — PROGRESS NOTES
Bladderscan performed in office today:  Pt voided - 120 mL, PVR - 9 mL     Alert and oriented to person, place and time. Normal mood and affect, no apparent risk to self or others

## 2025-06-04 ENCOUNTER — RESULTS FOLLOW-UP (OUTPATIENT)
Dept: UROLOGY | Age: 60
End: 2025-06-04

## 2025-06-04 LAB
MICROORGANISM SPEC CULT: NORMAL
SERVICE CMNT-IMP: NORMAL
SPECIMEN DESCRIPTION: NORMAL

## 2025-06-11 ENCOUNTER — HOSPITAL ENCOUNTER (OUTPATIENT)
Dept: CT IMAGING | Age: 60
Discharge: HOME | End: 2025-06-13
Attending: UROLOGY
Payer: COMMERCIAL

## 2025-06-11 ENCOUNTER — HOSPITAL ENCOUNTER (OUTPATIENT)
Age: 60
Discharge: HOME | End: 2025-06-11
Attending: UROLOGY
Payer: COMMERCIAL

## 2025-06-11 DIAGNOSIS — R10.30 LOWER ABDOMINAL PAIN: ICD-10-CM

## 2025-06-11 DIAGNOSIS — C64.2 MALIGNANT NEOPLASM OF LEFT KIDNEY, EXCEPT RENAL PELVIS (HCC): ICD-10-CM

## 2025-06-11 DIAGNOSIS — R31.0 GROSS HEMATURIA: ICD-10-CM

## 2025-06-11 LAB
BUN SERPL-MCNC: 23 MG/DL (ref 8–23)
CREAT SERPL-MCNC: 1.5 MG/DL (ref 0.7–1.2)
GFR, ESTIMATED: 54 ML/MIN/1.73M2

## 2025-06-11 PROCEDURE — 84520 ASSAY OF UREA NITROGEN: CPT

## 2025-06-11 PROCEDURE — 74178 CT ABD&PLV WO CNTR FLWD CNTR: CPT

## 2025-06-11 PROCEDURE — 6360000004 HC RX CONTRAST MEDICATION: Performed by: UROLOGY

## 2025-06-11 PROCEDURE — 36415 COLL VENOUS BLD VENIPUNCTURE: CPT

## 2025-06-11 PROCEDURE — 82565 ASSAY OF CREATININE: CPT

## 2025-06-11 RX ORDER — IOPAMIDOL 755 MG/ML
75 INJECTION, SOLUTION INTRAVASCULAR
Status: COMPLETED | OUTPATIENT
Start: 2025-06-11 | End: 2025-06-11

## 2025-06-11 RX ADMIN — IOPAMIDOL 75 ML: 755 INJECTION, SOLUTION INTRAVENOUS at 16:29

## 2025-07-03 ENCOUNTER — PROCEDURE VISIT (OUTPATIENT)
Dept: UROLOGY | Age: 60
End: 2025-07-03

## 2025-07-03 ENCOUNTER — HOSPITAL ENCOUNTER (OUTPATIENT)
Age: 60
Discharge: HOME | End: 2025-07-03
Payer: COMMERCIAL

## 2025-07-03 ENCOUNTER — HOSPITAL ENCOUNTER (OUTPATIENT)
Dept: NON INVASIVE DIAGNOSTICS | Age: 60
Discharge: HOME | End: 2025-07-03
Payer: COMMERCIAL

## 2025-07-03 VITALS
SYSTOLIC BLOOD PRESSURE: 124 MMHG | BODY MASS INDEX: 23.39 KG/M2 | TEMPERATURE: 97.3 F | WEIGHT: 163 LBS | DIASTOLIC BLOOD PRESSURE: 66 MMHG

## 2025-07-03 DIAGNOSIS — C64.2 MALIGNANT NEOPLASM OF LEFT KIDNEY, EXCEPT RENAL PELVIS (HCC): ICD-10-CM

## 2025-07-03 DIAGNOSIS — R31.0 GROSS HEMATURIA: ICD-10-CM

## 2025-07-03 DIAGNOSIS — N13.8 BPH WITH OBSTRUCTION/LOWER URINARY TRACT SYMPTOMS: ICD-10-CM

## 2025-07-03 DIAGNOSIS — N40.1 BPH WITH OBSTRUCTION/LOWER URINARY TRACT SYMPTOMS: ICD-10-CM

## 2025-07-03 DIAGNOSIS — R31.0 GROSS HEMATURIA: Primary | ICD-10-CM

## 2025-07-03 DIAGNOSIS — N32.89 BLADDER MASS: ICD-10-CM

## 2025-07-03 LAB
ANION GAP SERPL CALCULATED.3IONS-SCNC: 10 MMOL/L (ref 9–16)
BASOPHILS # BLD: <0.03 K/UL (ref 0–0.2)
BASOPHILS NFR BLD: 0 % (ref 0–2)
BUN SERPL-MCNC: 18 MG/DL (ref 8–23)
BUN/CREAT SERPL: 13 (ref 9–20)
CALCIUM SERPL-MCNC: 8.9 MG/DL (ref 8.6–10.4)
CHLORIDE SERPL-SCNC: 108 MMOL/L (ref 98–107)
CO2 SERPL-SCNC: 22 MMOL/L (ref 20–31)
CREAT SERPL-MCNC: 1.4 MG/DL (ref 0.7–1.2)
EOSINOPHIL # BLD: 0.15 K/UL (ref 0–0.44)
EOSINOPHILS RELATIVE PERCENT: 2 % (ref 1–4)
ERYTHROCYTE [DISTWIDTH] IN BLOOD BY AUTOMATED COUNT: 16.3 % (ref 11.8–14.4)
GFR, ESTIMATED: 57 ML/MIN/1.73M2
GLUCOSE SERPL-MCNC: 96 MG/DL (ref 74–99)
HCT VFR BLD AUTO: 34.4 % (ref 40.7–50.3)
HGB BLD-MCNC: 11.4 G/DL (ref 13–17)
IMM GRANULOCYTES # BLD AUTO: 0.04 K/UL (ref 0–0.3)
IMM GRANULOCYTES NFR BLD: 1 %
LYMPHOCYTES NFR BLD: 1.86 K/UL (ref 1.1–3.7)
LYMPHOCYTES RELATIVE PERCENT: 22 % (ref 24–43)
MCH RBC QN AUTO: 29.9 PG (ref 25.2–33.5)
MCHC RBC AUTO-ENTMCNC: 33.1 G/DL (ref 28.4–34.8)
MCV RBC AUTO: 90.3 FL (ref 82.6–102.9)
MONOCYTES NFR BLD: 0.63 K/UL (ref 0.1–1.2)
MONOCYTES NFR BLD: 8 % (ref 3–12)
NEUTROPHILS NFR BLD: 67 % (ref 36–65)
NEUTS SEG NFR BLD: 5.61 K/UL (ref 1.5–8.1)
NRBC BLD-RTO: 0 PER 100 WBC
PLATELET # BLD AUTO: 143 K/UL (ref 138–453)
PMV BLD AUTO: 10.9 FL (ref 8.1–13.5)
POTASSIUM SERPL-SCNC: 4.2 MMOL/L (ref 3.7–5.3)
RBC # BLD AUTO: 3.81 M/UL (ref 4.21–5.77)
SODIUM SERPL-SCNC: 140 MMOL/L (ref 136–145)
WBC OTHER # BLD: 8.3 K/UL (ref 3.5–11.3)

## 2025-07-03 PROCEDURE — 85025 COMPLETE CBC W/AUTO DIFF WBC: CPT

## 2025-07-03 PROCEDURE — 80048 BASIC METABOLIC PNL TOTAL CA: CPT

## 2025-07-03 PROCEDURE — 36415 COLL VENOUS BLD VENIPUNCTURE: CPT

## 2025-07-03 NOTE — PROGRESS NOTES
HPI:          Patient is a 60 y.o. male in no acute distress.  He is alert and oriented to person, place, and time.         History  Right testicular trauma as a child, right testicle is atrophied to nothing     1/2023 left radical nephrectomy. Pathology:  clear-cell renal cell carcinoma, vascular invasion, margins were clear.  The tumor did measure 6.2 cm. pT3a.       1/2024 referred for history of RCC and new gross hematuria. CT showed left nephrectomy otherwise there are no significant  abnormalities.  There is no evidence of metastasis.  Recent creatinine, 1.3. Patient did develop gross hematuria.                 Cystoscopy showed normal anatomy     2/2025 worsening nocturia              Started flomax     Currently  Patient is here today for follow-up for gross hematuria.  Patient did have a CT scan of the abdomen chest and pelvis today.  This was independently reviewed today.  This does show a mildly enlarged prostate otherwise no significant  abnormalities.  There is no evidence of recurrence or metastasis.  Patient has no pain today.  He has not had any recent gross hematuria.    Cystoscopy Procedure Note    Pre-operative Diagnosis: Gross hematuria    Post-operative Diagnosis: Same     Surgeon: Sia    Assistants: None    Anesthesia : Local    Procedure Details   The risks, benefits, complications, treatment options, and expected outcomes were discussed with the patient. The patient concurred with the proposed plan, giving informed consent.    Cystoscopy was performed today under local anesthesia, using sterile technique. The patient was placed in the dorsal lithotomy position, prepped with CHG, and draped in the usual sterile fashion. A 14 Citizen of [place] flexible cystoscope was used to systematically inspect both the urethra and bladder in their entirety.    Findings:  Anterior urethra: normal without strictures  Hyperplasia: Bilobar  Bladder: Normal mucosa, with lesions.  Ureteral orifice(s) was/were

## 2025-07-03 NOTE — PROGRESS NOTES
During cystoscopy the following was utilized on patient with no adverse affects:     45% SODIUM CHLORIDE 500 ML BAG  Lot number: [id number]  Expiration date: 10/2026        LIDOCAINE HYDROCHLORIDE JELLY 2%   Lot number: [id number]  Expiration date: 12/2026     CYSTOSCOPE  Lot:[id number]  Exp: 10/06/2027

## 2025-07-04 LAB
EKG ATRIAL RATE: 54 BPM
EKG P AXIS: 77 DEGREES
EKG P-R INTERVAL: 160 MS
EKG Q-T INTERVAL: 412 MS
EKG QRS DURATION: 144 MS
EKG QTC CALCULATION (BAZETT): 390 MS
EKG R AXIS: -67 DEGREES
EKG T AXIS: 44 DEGREES
EKG VENTRICULAR RATE: 54 BPM

## 2025-07-07 ENCOUNTER — TELEPHONE (OUTPATIENT)
Dept: UROLOGY | Age: 60
End: 2025-07-07

## 2025-07-07 DIAGNOSIS — N32.89 BLADDER MASS: ICD-10-CM

## 2025-07-07 NOTE — TELEPHONE ENCOUNTER
Patient wanted to report he stopped flomax. He reports that he told Dr at his cysto it was not working, and he discussed there could be another medication ordered. CYSTO done on 7/3/25

## 2025-07-14 ENCOUNTER — HOSPITAL ENCOUNTER (OUTPATIENT)
Age: 60
Discharge: HOME | End: 2025-07-14
Payer: COMMERCIAL

## 2025-07-14 ENCOUNTER — TELEPHONE (OUTPATIENT)
Dept: UROLOGY | Age: 60
End: 2025-07-14

## 2025-07-14 DIAGNOSIS — R31.0 GROSS HEMATURIA: Primary | ICD-10-CM

## 2025-07-14 DIAGNOSIS — N32.89 BLADDER MASS: ICD-10-CM

## 2025-07-14 PROCEDURE — 87086 URINE CULTURE/COLONY COUNT: CPT

## 2025-07-14 NOTE — TELEPHONE ENCOUNTER
The CT does not show any abnormalities.  Have him drop off a urine culture and we will call with result

## 2025-07-14 NOTE — TELEPHONE ENCOUNTER
Patient calls in concerned that his pain is more noticeable on his right side now. He also c/o lower back pain near his hips, rotating to the front on the right side. He reports his left kidney has been removed. He just wants to make sure nothing else is going on. He has a TURBT scheduled for 8/5.  He was asking if anything showed on recent CT scan in June. Thank you.

## 2025-07-16 LAB
MICROORGANISM SPEC CULT: NORMAL
SERVICE CMNT-IMP: NORMAL
SPECIMEN DESCRIPTION: NORMAL

## 2025-08-04 ENCOUNTER — ANESTHESIA EVENT (OUTPATIENT)
Dept: OPERATING ROOM | Age: 60
End: 2025-08-04
Payer: COMMERCIAL

## 2025-08-05 ENCOUNTER — HOSPITAL ENCOUNTER (OUTPATIENT)
Age: 60
Setting detail: OUTPATIENT SURGERY
Discharge: HOME | End: 2025-08-05
Attending: UROLOGY | Admitting: UROLOGY
Payer: COMMERCIAL

## 2025-08-05 ENCOUNTER — ANESTHESIA (OUTPATIENT)
Dept: OPERATING ROOM | Age: 60
End: 2025-08-05
Payer: COMMERCIAL

## 2025-08-05 VITALS
TEMPERATURE: 96.8 F | SYSTOLIC BLOOD PRESSURE: 164 MMHG | HEIGHT: 70 IN | HEART RATE: 66 BPM | WEIGHT: 159 LBS | RESPIRATION RATE: 14 BRPM | BODY MASS INDEX: 22.76 KG/M2 | OXYGEN SATURATION: 97 % | DIASTOLIC BLOOD PRESSURE: 89 MMHG

## 2025-08-05 DIAGNOSIS — N32.89 BLADDER MASS: ICD-10-CM

## 2025-08-05 PROCEDURE — 3600000003 HC SURGERY LEVEL 3 BASE: Performed by: UROLOGY

## 2025-08-05 PROCEDURE — 7100000010 HC PHASE II RECOVERY - FIRST 15 MIN: Performed by: UROLOGY

## 2025-08-05 PROCEDURE — 2500000003 HC RX 250 WO HCPCS: Performed by: NURSE ANESTHETIST, CERTIFIED REGISTERED

## 2025-08-05 PROCEDURE — 3600000013 HC SURGERY LEVEL 3 ADDTL 15MIN: Performed by: UROLOGY

## 2025-08-05 PROCEDURE — 6360000002 HC RX W HCPCS: Performed by: UROLOGY

## 2025-08-05 PROCEDURE — 3700000001 HC ADD 15 MINUTES (ANESTHESIA): Performed by: UROLOGY

## 2025-08-05 PROCEDURE — 7100000011 HC PHASE II RECOVERY - ADDTL 15 MIN: Performed by: UROLOGY

## 2025-08-05 PROCEDURE — 88305 TISSUE EXAM BY PATHOLOGIST: CPT

## 2025-08-05 PROCEDURE — 6370000000 HC RX 637 (ALT 250 FOR IP): Performed by: NURSE ANESTHETIST, CERTIFIED REGISTERED

## 2025-08-05 PROCEDURE — 2709999900 HC NON-CHARGEABLE SUPPLY: Performed by: UROLOGY

## 2025-08-05 PROCEDURE — 2720000010 HC SURG SUPPLY STERILE: Performed by: UROLOGY

## 2025-08-05 PROCEDURE — 3700000000 HC ANESTHESIA ATTENDED CARE: Performed by: UROLOGY

## 2025-08-05 PROCEDURE — 2500000003 HC RX 250 WO HCPCS: Performed by: UROLOGY

## 2025-08-05 PROCEDURE — 6370000000 HC RX 637 (ALT 250 FOR IP): Performed by: UROLOGY

## 2025-08-05 PROCEDURE — 6360000002 HC RX W HCPCS: Performed by: NURSE ANESTHETIST, CERTIFIED REGISTERED

## 2025-08-05 PROCEDURE — 2580000003 HC RX 258: Performed by: NURSE ANESTHETIST, CERTIFIED REGISTERED

## 2025-08-05 RX ORDER — LIDOCAINE HYDROCHLORIDE 20 MG/ML
JELLY TOPICAL PRN
Status: DISCONTINUED | OUTPATIENT
Start: 2025-08-05 | End: 2025-08-05 | Stop reason: ALTCHOICE

## 2025-08-05 RX ORDER — LIDOCAINE HYDROCHLORIDE 20 MG/ML
INJECTION, SOLUTION EPIDURAL; INFILTRATION; INTRACAUDAL; PERINEURAL
Status: DISCONTINUED | OUTPATIENT
Start: 2025-08-05 | End: 2025-08-05 | Stop reason: SDUPTHER

## 2025-08-05 RX ORDER — ONDANSETRON 2 MG/ML
INJECTION INTRAMUSCULAR; INTRAVENOUS
Status: DISCONTINUED | OUTPATIENT
Start: 2025-08-05 | End: 2025-08-05 | Stop reason: SDUPTHER

## 2025-08-05 RX ORDER — DEXAMETHASONE SODIUM PHOSPHATE 4 MG/ML
INJECTION, SOLUTION INTRA-ARTICULAR; INTRALESIONAL; INTRAMUSCULAR; INTRAVENOUS; SOFT TISSUE
Status: DISCONTINUED | OUTPATIENT
Start: 2025-08-05 | End: 2025-08-05 | Stop reason: SDUPTHER

## 2025-08-05 RX ORDER — GLYCOPYRROLATE 1 MG/5 ML
SYRINGE (ML) INTRAVENOUS
Status: DISCONTINUED | OUTPATIENT
Start: 2025-08-05 | End: 2025-08-05 | Stop reason: SDUPTHER

## 2025-08-05 RX ORDER — OXYCODONE HYDROCHLORIDE 5 MG/1
10 TABLET ORAL PRN
Status: DISCONTINUED | OUTPATIENT
Start: 2025-08-05 | End: 2025-08-05 | Stop reason: HOSPADM

## 2025-08-05 RX ORDER — LABETALOL HYDROCHLORIDE 5 MG/ML
10 INJECTION, SOLUTION INTRAVENOUS EVERY 10 MIN PRN
Status: DISCONTINUED | OUTPATIENT
Start: 2025-08-05 | End: 2025-08-05 | Stop reason: HOSPADM

## 2025-08-05 RX ORDER — ONDANSETRON 2 MG/ML
4 INJECTION INTRAMUSCULAR; INTRAVENOUS
Status: DISCONTINUED | OUTPATIENT
Start: 2025-08-05 | End: 2025-08-05 | Stop reason: HOSPADM

## 2025-08-05 RX ORDER — DIMENHYDRINATE 50 MG
50 TABLET ORAL ONCE
Status: COMPLETED | OUTPATIENT
Start: 2025-08-05 | End: 2025-08-05

## 2025-08-05 RX ORDER — FENTANYL CITRATE 50 UG/ML
25 INJECTION, SOLUTION INTRAMUSCULAR; INTRAVENOUS EVERY 5 MIN PRN
Status: DISCONTINUED | OUTPATIENT
Start: 2025-08-05 | End: 2025-08-05 | Stop reason: HOSPADM

## 2025-08-05 RX ORDER — PROPOFOL 10 MG/ML
INJECTION, EMULSION INTRAVENOUS
Status: DISCONTINUED | OUTPATIENT
Start: 2025-08-05 | End: 2025-08-05 | Stop reason: SDUPTHER

## 2025-08-05 RX ORDER — PROCHLORPERAZINE EDISYLATE 5 MG/ML
5 INJECTION INTRAMUSCULAR; INTRAVENOUS
Status: DISCONTINUED | OUTPATIENT
Start: 2025-08-05 | End: 2025-08-05 | Stop reason: HOSPADM

## 2025-08-05 RX ORDER — SODIUM CHLORIDE, SODIUM LACTATE, POTASSIUM CHLORIDE, CALCIUM CHLORIDE 600; 310; 30; 20 MG/100ML; MG/100ML; MG/100ML; MG/100ML
INJECTION, SOLUTION INTRAVENOUS CONTINUOUS
Status: DISCONTINUED | OUTPATIENT
Start: 2025-08-05 | End: 2025-08-05 | Stop reason: HOSPADM

## 2025-08-05 RX ORDER — ROCURONIUM BROMIDE 10 MG/ML
INJECTION, SOLUTION INTRAVENOUS
Status: DISCONTINUED | OUTPATIENT
Start: 2025-08-05 | End: 2025-08-05 | Stop reason: SDUPTHER

## 2025-08-05 RX ORDER — FENTANYL CITRATE 50 UG/ML
INJECTION, SOLUTION INTRAMUSCULAR; INTRAVENOUS
Status: DISCONTINUED | OUTPATIENT
Start: 2025-08-05 | End: 2025-08-05 | Stop reason: SDUPTHER

## 2025-08-05 RX ORDER — LABETALOL HYDROCHLORIDE 5 MG/ML
INJECTION, SOLUTION INTRAVENOUS
Status: DISCONTINUED | OUTPATIENT
Start: 2025-08-05 | End: 2025-08-05 | Stop reason: SDUPTHER

## 2025-08-05 RX ORDER — OXYCODONE HYDROCHLORIDE 5 MG/1
5 TABLET ORAL PRN
Status: DISCONTINUED | OUTPATIENT
Start: 2025-08-05 | End: 2025-08-05 | Stop reason: HOSPADM

## 2025-08-05 RX ORDER — ACETAMINOPHEN 325 MG/1
650 TABLET ORAL ONCE
Status: COMPLETED | OUTPATIENT
Start: 2025-08-05 | End: 2025-08-05

## 2025-08-05 RX ORDER — FENTANYL CITRATE 50 UG/ML
50 INJECTION, SOLUTION INTRAMUSCULAR; INTRAVENOUS EVERY 5 MIN PRN
Status: DISCONTINUED | OUTPATIENT
Start: 2025-08-05 | End: 2025-08-05 | Stop reason: HOSPADM

## 2025-08-05 RX ADMIN — PROPOFOL 160 MG: 10 INJECTION, EMULSION INTRAVENOUS at 14:33

## 2025-08-05 RX ADMIN — FENTANYL CITRATE 50 MCG: 50 INJECTION INTRAMUSCULAR; INTRAVENOUS at 14:59

## 2025-08-05 RX ADMIN — ROCURONIUM BROMIDE 40 MG: 10 INJECTION, SOLUTION INTRAVENOUS at 14:34

## 2025-08-05 RX ADMIN — ACETAMINOPHEN 650 MG: 325 TABLET ORAL at 12:49

## 2025-08-05 RX ADMIN — ONDANSETRON 4 MG: 2 INJECTION, SOLUTION INTRAMUSCULAR; INTRAVENOUS at 14:45

## 2025-08-05 RX ADMIN — SUGAMMADEX 200 MG: 100 INJECTION, SOLUTION INTRAVENOUS at 14:53

## 2025-08-05 RX ADMIN — DEXAMETHASONE SODIUM PHOSPHATE 4 MG: 4 INJECTION, SOLUTION INTRAMUSCULAR; INTRAVENOUS at 14:44

## 2025-08-05 RX ADMIN — Medication 0.4 MG: at 14:32

## 2025-08-05 RX ADMIN — LABETALOL HYDROCHLORIDE 10 MG: 5 INJECTION INTRAVENOUS at 14:57

## 2025-08-05 RX ADMIN — LIDOCAINE HYDROCHLORIDE 80 MG: 20 INJECTION, SOLUTION EPIDURAL; INFILTRATION; INTRACAUDAL; PERINEURAL at 14:33

## 2025-08-05 RX ADMIN — DIMENHYDRINATE 50 MG: 50 TABLET ORAL at 12:49

## 2025-08-05 RX ADMIN — WATER 2000 MG: 1 INJECTION INTRAMUSCULAR; INTRAVENOUS; SUBCUTANEOUS at 14:39

## 2025-08-05 RX ADMIN — FENTANYL CITRATE 50 MCG: 50 INJECTION INTRAMUSCULAR; INTRAVENOUS at 14:29

## 2025-08-05 RX ADMIN — SODIUM CHLORIDE, SODIUM LACTATE, POTASSIUM CHLORIDE, AND CALCIUM CHLORIDE: .6; .31; .03; .02 INJECTION, SOLUTION INTRAVENOUS at 12:48

## 2025-08-05 ASSESSMENT — PAIN DESCRIPTION - PAIN TYPE: TYPE: CHRONIC PAIN

## 2025-08-05 ASSESSMENT — PAIN SCALES - GENERAL: PAINLEVEL_OUTOF10: 3

## 2025-08-05 ASSESSMENT — LIFESTYLE VARIABLES: SMOKING_STATUS: 1

## 2025-08-05 ASSESSMENT — PAIN - FUNCTIONAL ASSESSMENT: PAIN_FUNCTIONAL_ASSESSMENT: 0-10

## 2025-08-05 ASSESSMENT — PAIN DESCRIPTION - ORIENTATION: ORIENTATION: LEFT

## 2025-08-05 ASSESSMENT — PAIN DESCRIPTION - DESCRIPTORS: DESCRIPTORS: ACHING

## 2025-08-05 ASSESSMENT — PAIN DESCRIPTION - LOCATION: LOCATION: BACK;RIB CAGE

## 2025-08-06 ENCOUNTER — OFFICE VISIT (OUTPATIENT)
Dept: UROLOGY | Age: 60
End: 2025-08-06
Payer: COMMERCIAL

## 2025-08-06 ENCOUNTER — HOSPITAL ENCOUNTER (OUTPATIENT)
Age: 60
Setting detail: SPECIMEN
Discharge: HOME | End: 2025-08-06
Payer: COMMERCIAL

## 2025-08-06 VITALS
BODY MASS INDEX: 23.96 KG/M2 | WEIGHT: 167 LBS | TEMPERATURE: 97.8 F | HEART RATE: 61 BPM | SYSTOLIC BLOOD PRESSURE: 131 MMHG | DIASTOLIC BLOOD PRESSURE: 81 MMHG

## 2025-08-06 DIAGNOSIS — R33.9 URINARY RETENTION: ICD-10-CM

## 2025-08-06 DIAGNOSIS — N40.1 BPH WITH OBSTRUCTION/LOWER URINARY TRACT SYMPTOMS: ICD-10-CM

## 2025-08-06 DIAGNOSIS — N32.89 BLADDER MASS: ICD-10-CM

## 2025-08-06 DIAGNOSIS — R33.9 URINARY RETENTION: Primary | ICD-10-CM

## 2025-08-06 DIAGNOSIS — N13.8 BPH WITH OBSTRUCTION/LOWER URINARY TRACT SYMPTOMS: ICD-10-CM

## 2025-08-06 PROCEDURE — 51798 US URINE CAPACITY MEASURE: CPT | Performed by: PHYSICIAN ASSISTANT

## 2025-08-06 PROCEDURE — 99214 OFFICE O/P EST MOD 30 MIN: CPT | Performed by: PHYSICIAN ASSISTANT

## 2025-08-06 PROCEDURE — 51702 INSERT TEMP BLADDER CATH: CPT | Performed by: PHYSICIAN ASSISTANT

## 2025-08-06 PROCEDURE — 87086 URINE CULTURE/COLONY COUNT: CPT

## 2025-08-06 RX ORDER — TAMSULOSIN HYDROCHLORIDE 0.4 MG/1
0.4 CAPSULE ORAL DAILY
Qty: 60 CAPSULE | Refills: 1 | Status: SHIPPED | OUTPATIENT
Start: 2025-08-06

## 2025-08-06 RX ORDER — LEVOFLOXACIN 500 MG/1
500 TABLET, FILM COATED ORAL DAILY
Qty: 10 TABLET | Refills: 0 | Status: SHIPPED | OUTPATIENT
Start: 2025-08-06 | End: 2025-08-08

## 2025-08-07 ENCOUNTER — RESULTS FOLLOW-UP (OUTPATIENT)
Dept: UROLOGY | Age: 60
End: 2025-08-07

## 2025-08-07 LAB
MICROORGANISM SPEC CULT: NO GROWTH
SERVICE CMNT-IMP: NORMAL
SPECIMEN DESCRIPTION: NORMAL
SURGICAL PATHOLOGY REPORT: NORMAL

## 2025-08-08 ENCOUNTER — TELEPHONE (OUTPATIENT)
Dept: UROLOGY | Age: 60
End: 2025-08-08

## 2025-08-08 RX ORDER — SULFAMETHOXAZOLE AND TRIMETHOPRIM 400; 80 MG/1; MG/1
1 TABLET ORAL 2 TIMES DAILY
Qty: 20 TABLET | Refills: 0 | Status: SHIPPED | OUTPATIENT
Start: 2025-08-08 | End: 2025-08-18

## 2025-08-14 ENCOUNTER — OFFICE VISIT (OUTPATIENT)
Dept: UROLOGY | Age: 60
End: 2025-08-14

## 2025-08-14 VITALS
SYSTOLIC BLOOD PRESSURE: 118 MMHG | BODY MASS INDEX: 23.39 KG/M2 | DIASTOLIC BLOOD PRESSURE: 68 MMHG | TEMPERATURE: 97.7 F | WEIGHT: 163 LBS

## 2025-08-14 DIAGNOSIS — N13.8 BPH WITH OBSTRUCTION/LOWER URINARY TRACT SYMPTOMS: Primary | ICD-10-CM

## 2025-08-14 DIAGNOSIS — N40.1 BPH WITH OBSTRUCTION/LOWER URINARY TRACT SYMPTOMS: Primary | ICD-10-CM

## 2025-08-14 DIAGNOSIS — C64.2 MALIGNANT NEOPLASM OF LEFT KIDNEY, EXCEPT RENAL PELVIS (HCC): ICD-10-CM

## (undated) DEVICE — CLIP INT L ORNG TI TRNSVRS GRV CHEVRON SHP W/ PRECIS TIP TO

## (undated) DEVICE — SUTURE VCRL + SZ 3-0 L27IN ABSRB WHT CT-1 1/2 CIR VCP258H

## (undated) DEVICE — Device

## (undated) DEVICE — SOLUTION IRRIG 3000ML 0.9% SOD CHL USP UROMATIC PLAS CONT

## (undated) DEVICE — CONTAINER,SPECIMEN,4OZ,OR STRL: Brand: MEDLINE

## (undated) DEVICE — FOGARTY - HYDRAGRIP SURGICAL - CLAMP INSERTS: Brand: FOGARTY SOFTJAW

## (undated) DEVICE — SVMMC VASC MAJ PK

## (undated) DEVICE — PLUG CATH DRNGE PROTCT TB

## (undated) DEVICE — GLOVE SURG SZ 75 CRM LTX FREE POLYISOPRENE POLYMER BEAD ANTI

## (undated) DEVICE — SUTURE VCRL + SZ 2-0 L27IN ABSRB CLR CT-1 1/2 CIR TAPERCUT VCP259H

## (undated) DEVICE — MANIFOLD SUCT 4 PRT 2 CANSTR FLTR DISP NEPTUNE 2

## (undated) DEVICE — COVER,LIGHT HANDLE,FLX,2/PK: Brand: MEDLINE INDUSTRIES, INC.

## (undated) DEVICE — DRESSING TRNSPAR W5XL4.5IN FLM SHT SEMIPERMEABLE WIND

## (undated) DEVICE — GLOVE SURG SZ 65 THK91MIL LTX FREE SYN POLYISOPRENE

## (undated) DEVICE — SYRINGE MED 20ML STD CLR PLAS LUERLOCK TIP N CTRL DISP

## (undated) DEVICE — SUTURE VCRL SZ 1 L18IN ABSRB VLT CT-1 L36MM 1/2 CIR J741D

## (undated) DEVICE — INTENDED FOR TISSUE SEPARATION, AND OTHER PROCEDURES THAT REQUIRE A SHARP SURGICAL BLADE TO PUNCTURE OR CUT.: Brand: BARD-PARKER ® CARBON RIB-BACK BLADES

## (undated) DEVICE — SOLUTION ANTIFOG VIS SYS CLEARIFY LAPSCP

## (undated) DEVICE — GLOVE ORANGE PI 8 1/2   MSG9085

## (undated) DEVICE — ADHESIVE SKIN CLOSURE TOP 36 CC HI VISC DERMBND MINI

## (undated) DEVICE — PACK PROCEDURE SURG ROBOTIC KID SVMMC

## (undated) DEVICE — 3M™ IOBAN™ 2 ANTIMICROBIAL INCISE DRAPE 6650EZ: Brand: IOBAN™ 2

## (undated) DEVICE — SUTURE PERMAHAND SZ 3-0 L30IN NONABSORBABLE BLK L26MM SH C017D

## (undated) DEVICE — ELECTRODE PT RET AD L9FT HI MOIST COND ADH HYDRGEL CORDED

## (undated) DEVICE — GOWN,SIRUS,NONRNF,SETINSLV,XL,20/CS: Brand: MEDLINE

## (undated) DEVICE — TRI-LUMEN FILTERED TUBE SET WITH ACTIVATED CHARCOAL FILTER: Brand: AIRSEAL

## (undated) DEVICE — SUTURE PDS II SZ 0 L27IN ABSRB VLT L36MM CT-1 1/2 CIR Z340H

## (undated) DEVICE — GLOVE SURG SZ 75 L12IN FNGR THK79MIL GRN LTX FREE

## (undated) DEVICE — CLIP INT M L GRN TI TRNSVRS GRV CHEVRON SHP W/ PRECIS TIP

## (undated) DEVICE — COVER LT HNDL BLU PLAS

## (undated) DEVICE — DRAPE, SLUSH XL, 44X66, STERILE: Brand: MEDLINE

## (undated) DEVICE — SCISSOR SURG METZ CRV TIP

## (undated) DEVICE — GOWN,AURORA,NONREINFORCED,LARGE: Brand: MEDLINE

## (undated) DEVICE — APPLICATOR MEDICATED 10.5 CC SOLUTION HI LT ORNG CHLORAPREP

## (undated) DEVICE — TIP COVER ACCESSORY

## (undated) DEVICE — SUTURE ABSORBABLE MONOFILAMENT 0 TP1 60 IN VIO PDS + PDP991G

## (undated) DEVICE — SUTURE PROL SZ 4-0 L36IN NONABSORBABLE BLU L26MM SH 1/2 CIR 8521H

## (undated) DEVICE — TROCAR: Brand: KII FIOS FIRST ENTRY

## (undated) DEVICE — SUTURE PROL SZ 5-0 L36IN NONABSORBABLE BLU L13MM C-1 3/8 8720H

## (undated) DEVICE — BLADE CLIPPER GEN PURP NS

## (undated) DEVICE — BAG SPEC LAP 9X7.5 IN 12 MM 1500 CC MEM WIRE CANN NYL

## (undated) DEVICE — SUTURE PERMAHAND SZ 2-0 L30IN NONABSORBABLE BLK SILK W/O A305H

## (undated) DEVICE — SUTURE PERMAHAND SZ 4-0 L18IN NONABSORBABLE BLK SILK BRAID A183H

## (undated) DEVICE — GLOVE SURG SZ 65 L12IN FNGR THK79MIL GRN LTX FREE

## (undated) DEVICE — APPLICATOR LAP 45CM FLX 2 VISTASEAL

## (undated) DEVICE — GLOVE SURG SZ 8 CRM LTX FREE POLYISOPRENE POLYMER BEAD ANTI

## (undated) DEVICE — GARMENT,MEDLINE,DVT,INT,CALF,MED, GEN2: Brand: MEDLINE

## (undated) DEVICE — SUTURE NONABSORBABLE MONOFILAMENT 6-0 C-1 1X30 IN PROLENE 8706H

## (undated) DEVICE — TOTAL TRAY, 16FR 10ML SIL FOLEY, URN: Brand: MEDLINE

## (undated) DEVICE — STANDARD HYPODERMIC NEEDLE,ALUMINUM HUB: Brand: MONOJECT

## (undated) DEVICE — TOWEL,OR,DSP,ST,NATURAL,DLX,4/PK,20PK/CS: Brand: MEDLINE

## (undated) DEVICE — APPLICATOR MEDICATED 26 CC SOLUTION HI LT ORNG CHLORAPREP

## (undated) DEVICE — TUBING SUCT 12FR MAL ALUM SHFT FN CAP VENT UNIV CONN W/ OBT

## (undated) DEVICE — BAG URIN DRNGE 2000ML SLDE TAP LUERLOCK PRT ANTI REFLX TWR

## (undated) DEVICE — 3M™ IOBAN™ 2 ANTIMICROBIAL INCISE DRAPE 6651EZ: Brand: IOBAN™ 2

## (undated) DEVICE — SUTURE ETHBND EXCEL SZ 2-0 L30IN NONABSORBABLE GRN CT1 X423H

## (undated) DEVICE — RELOAD STPL L45MM H1-2.6MM MESENTERY THN TISS WHT GRIPPING

## (undated) DEVICE — ELECTRO LUBE IS A SINGLE PATIENT USE DEVICE THAT IS INTENDED TO BE USED ON ELECTROSURGICAL ELECTRODES TO REDUCE STICKING.: Brand: KEY SURGICAL ELECTRO LUBE

## (undated) DEVICE — FOGARTY - HYDRAGRIP SURGICAL - CLAMP INSERTS: Brand: FOGARTY HYDRAJAW

## (undated) DEVICE — BLADELESS OBTURATOR: Brand: WECK VISTA

## (undated) DEVICE — SHEET TRNSF W355XL435IN TBLR ORNG MAXI MINI

## (undated) DEVICE — CANNULA SEAL

## (undated) DEVICE — GLOVE ORANGE PI 7   MSG9070

## (undated) DEVICE — CATH ALL PURPOSE 18FR RUBBER 12/CT RED

## (undated) DEVICE — GLOVE SURG SZ 7 CRM LTX FREE POLYISOPRENE POLYMER BEAD ANTI

## (undated) DEVICE — GLOVE SURG SZ 7 L12IN FNGR THK79MIL GRN LTX FREE

## (undated) DEVICE — SUTURE ABSORBABLE BRAIDED 2-0 CT-1 27 IN UD VICRYL J259H

## (undated) DEVICE — SUTURE PERMAHAND SZ 3-0 L30IN NONABSORBABLE BLK SH L26MM K832H

## (undated) DEVICE — NEEDLE HYPO 25GA L1.5IN BLU POLYPR HUB S STL REG BVL STR

## (undated) DEVICE — SUTURE PERMAHAND SZ 3-0 L30IN NONABSORBABLE BLK SILK BRAID A304H

## (undated) DEVICE — GLOVE EXAM M L95IN FNGR THK35MIL PALM THK24MIL OFF WHT

## (undated) DEVICE — TAPE UMB 72X7/32 IN

## (undated) DEVICE — SEALANT TISS 4 CC FIBRIN VISTASEAL

## (undated) DEVICE — BLADE ES L6IN ELASTOMERIC COAT EXT DURABLE BEND UPTO 90DEG

## (undated) DEVICE — GLOVE SURG SZ 7.5 L11.73IN FNGR THK9.8MIL STRW LTX POLYMER

## (undated) DEVICE — NEEDLE,25GX1.5",REG,BEVEL: Brand: MEDLINE

## (undated) DEVICE — TOWEL,OR,DSP,ST,BLUE,DLX,XR,4/PK,20PK/CS: Brand: MEDLINE

## (undated) DEVICE — AIRSEAL 12 MM ACCESS PORT AND PALM GRIP OBTURATOR WITH BLADELESS OPTICAL TIP, 120 MM LENGTH: Brand: AIRSEAL

## (undated) DEVICE — ARM DRAPE

## (undated) DEVICE — SYRINGE, LUER LOCK, 10ML: Brand: MEDLINE

## (undated) DEVICE — EVACUATOR URO BLDR W/ ADPT UROVAC

## (undated) DEVICE — INSUFFLATION NEEDLE TO ESTABLISH PNEUMOPERITONEUM.: Brand: INSUFFLATION NEEDLE

## (undated) DEVICE — SUTURE ETHLN SZ 4-0 L18IN NONABSORBABLE BLK L19MM PS-2 3/8 1667H

## (undated) DEVICE — GLOVE SURG SZ 65 CRM LTX FREE POLYISOPRENE POLYMER BEAD ANTI

## (undated) DEVICE — PROVE COVER: Brand: UNBRANDED

## (undated) DEVICE — GAUZE,SPONGE,FLUFF,6"X6.75",STRL,5/TRAY: Brand: MEDLINE